# Patient Record
Sex: FEMALE | Race: OTHER | HISPANIC OR LATINO
[De-identification: names, ages, dates, MRNs, and addresses within clinical notes are randomized per-mention and may not be internally consistent; named-entity substitution may affect disease eponyms.]

---

## 2017-06-06 ENCOUNTER — APPOINTMENT (OUTPATIENT)
Dept: OPHTHALMOLOGY | Facility: CLINIC | Age: 82
End: 2017-06-06

## 2017-12-05 ENCOUNTER — APPOINTMENT (OUTPATIENT)
Dept: OPHTHALMOLOGY | Facility: CLINIC | Age: 82
End: 2017-12-05
Payer: MEDICARE

## 2017-12-05 DIAGNOSIS — G51.0 BELL'S PALSY: ICD-10-CM

## 2017-12-05 DIAGNOSIS — H35.30 UNSPECIFIED MACULAR DEGENERATION: ICD-10-CM

## 2017-12-05 DIAGNOSIS — H25.89 OTHER AGE-RELATED CATARACT: ICD-10-CM

## 2017-12-05 DIAGNOSIS — H40.053 OCULAR HYPERTENSION, BILATERAL: ICD-10-CM

## 2017-12-05 DIAGNOSIS — H25.813 COMBINED FORMS OF AGE-RELATED CATARACT, BILATERAL: ICD-10-CM

## 2017-12-05 DIAGNOSIS — H43.819 VITREOUS DEGENERATION, UNSPECIFIED EYE: ICD-10-CM

## 2017-12-05 PROCEDURE — 92014 COMPRE OPH EXAM EST PT 1/>: CPT

## 2017-12-15 ENCOUNTER — EMERGENCY (EMERGENCY)
Facility: HOSPITAL | Age: 82
LOS: 1 days | Discharge: HOME | End: 2017-12-15
Attending: EMERGENCY MEDICINE
Payer: MEDICARE

## 2017-12-15 VITALS
HEIGHT: 57 IN | OXYGEN SATURATION: 95 % | HEART RATE: 77 BPM | SYSTOLIC BLOOD PRESSURE: 190 MMHG | WEIGHT: 117.95 LBS | RESPIRATION RATE: 16 BRPM | DIASTOLIC BLOOD PRESSURE: 59 MMHG | TEMPERATURE: 97 F

## 2017-12-15 PROCEDURE — 99284 EMERGENCY DEPT VISIT MOD MDM: CPT | Mod: 25

## 2017-12-15 PROCEDURE — 99284 EMERGENCY DEPT VISIT MOD MDM: CPT

## 2017-12-15 PROCEDURE — 73080 X-RAY EXAM OF ELBOW: CPT

## 2017-12-15 PROCEDURE — 73080 X-RAY EXAM OF ELBOW: CPT | Mod: 26,RT

## 2017-12-15 PROCEDURE — 29105 APPLICATION LONG ARM SPLINT: CPT | Mod: RT

## 2017-12-15 NOTE — ED PROVIDER NOTE - MEDICAL DECISION MAKING DETAILS
89 y/o F pt with right elbow pain s/p mechanical fall. Will obtain x-ray to r/o fracture, give pain medication and reassess.
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
## 2017-12-15 NOTE — ED PROVIDER NOTE - MUSCULOSKELETAL, MLM
Large swelling and ecchymosis to the right elbow, dependant ecchymosis to the right forearm. Decreased range of motion to the right elbow secondary to the pain. No tenderness to the right shoulder, wrist, or hand with full range of motion.

## 2017-12-15 NOTE — ED PROVIDER NOTE - PROGRESS NOTE DETAILS
Patient is resting comfortably, NAD. Spoke with ortho BOLIVAR Gill who will come see patient. Patient seen and splinted by ortho. TO f/u with Shyam or Vasquez in 1 week. Return to the ED immediately if getting worse, not improving, or if having any new or troubling symptoms.

## 2017-12-15 NOTE — ED PROVIDER NOTE - OBJECTIVE STATEMENT
89 y/o F pt with no significant PMHx presents to ED c/o right elbow pain and swelling x today. Pt reports she mechanical tripped and fell today and landed on her right elbow. Pt states her elbow pain is worsened with movement. Pt denies fever, chills, nausea, vomiting, diarrhea, abd pain, numbness, tingling, weakness, LOC, head trauma, or any other complaints. NKDA.

## 2017-12-15 NOTE — ED PROVIDER NOTE - CHPI ED SYMPTOMS NEG
no fever, no chills, no nausea, no vomiting, no diarrhea, no abd pain, no numbness, no tingling, no weakness, no LOC
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
## 2018-03-27 ENCOUNTER — INPATIENT (INPATIENT)
Facility: HOSPITAL | Age: 83
LOS: 5 days | Discharge: NURSING, REHAB OR LONG-TERM CARE | DRG: 65 | End: 2018-04-02
Attending: INTERNAL MEDICINE | Admitting: INTERNAL MEDICINE
Payer: MEDICARE

## 2018-03-27 VITALS
TEMPERATURE: 97 F | OXYGEN SATURATION: 100 % | WEIGHT: 119.93 LBS | HEART RATE: 76 BPM | HEIGHT: 65 IN | DIASTOLIC BLOOD PRESSURE: 84 MMHG | RESPIRATION RATE: 18 BRPM | SYSTOLIC BLOOD PRESSURE: 190 MMHG

## 2018-03-27 DIAGNOSIS — I10 ESSENTIAL (PRIMARY) HYPERTENSION: ICD-10-CM

## 2018-03-27 DIAGNOSIS — Z29.9 ENCOUNTER FOR PROPHYLACTIC MEASURES, UNSPECIFIED: ICD-10-CM

## 2018-03-27 DIAGNOSIS — Z71.89 OTHER SPECIFIED COUNSELING: ICD-10-CM

## 2018-03-27 DIAGNOSIS — E86.0 DEHYDRATION: ICD-10-CM

## 2018-03-27 DIAGNOSIS — E11.9 TYPE 2 DIABETES MELLITUS WITHOUT COMPLICATIONS: ICD-10-CM

## 2018-03-27 DIAGNOSIS — R55 SYNCOPE AND COLLAPSE: ICD-10-CM

## 2018-03-27 LAB
ACETONE SERPL-MCNC: NEGATIVE — SIGNIFICANT CHANGE UP
ALBUMIN SERPL ELPH-MCNC: 3.4 G/DL — LOW (ref 3.5–5)
ALP SERPL-CCNC: 74 U/L — SIGNIFICANT CHANGE UP (ref 40–120)
ALT FLD-CCNC: 38 U/L DA — SIGNIFICANT CHANGE UP (ref 10–60)
ANION GAP SERPL CALC-SCNC: 8 MMOL/L — SIGNIFICANT CHANGE UP (ref 5–17)
APPEARANCE UR: CLEAR — SIGNIFICANT CHANGE UP
APTT BLD: 34.9 SEC — SIGNIFICANT CHANGE UP (ref 27.5–37.4)
AST SERPL-CCNC: 31 U/L — SIGNIFICANT CHANGE UP (ref 10–40)
BACTERIA # UR AUTO: ABNORMAL /HPF
BASOPHILS # BLD AUTO: 0.1 K/UL — SIGNIFICANT CHANGE UP (ref 0–0.2)
BASOPHILS NFR BLD AUTO: 0.8 % — SIGNIFICANT CHANGE UP (ref 0–2)
BILIRUB SERPL-MCNC: 0.3 MG/DL — SIGNIFICANT CHANGE UP (ref 0.2–1.2)
BILIRUB UR-MCNC: NEGATIVE — SIGNIFICANT CHANGE UP
BUN SERPL-MCNC: 31 MG/DL — HIGH (ref 7–18)
CALCIUM SERPL-MCNC: 9 MG/DL — SIGNIFICANT CHANGE UP (ref 8.4–10.5)
CHLORIDE SERPL-SCNC: 99 MMOL/L — SIGNIFICANT CHANGE UP (ref 96–108)
CK MB BLD-MCNC: 3.8 % — HIGH (ref 0–3.5)
CK MB CFR SERPL CALC: 5.9 NG/ML — HIGH (ref 0–3.6)
CK SERPL-CCNC: 154 U/L — SIGNIFICANT CHANGE UP (ref 21–215)
CO2 SERPL-SCNC: 26 MMOL/L — SIGNIFICANT CHANGE UP (ref 22–31)
COLOR SPEC: YELLOW — SIGNIFICANT CHANGE UP
CREAT SERPL-MCNC: 0.65 MG/DL — SIGNIFICANT CHANGE UP (ref 0.5–1.3)
DIFF PNL FLD: ABNORMAL
EOSINOPHIL # BLD AUTO: 0 K/UL — SIGNIFICANT CHANGE UP (ref 0–0.5)
EOSINOPHIL NFR BLD AUTO: 0.1 % — SIGNIFICANT CHANGE UP (ref 0–6)
EPI CELLS # UR: ABNORMAL /HPF
GLUCOSE SERPL-MCNC: 210 MG/DL — HIGH (ref 70–99)
GLUCOSE UR QL: 250
GRAN CASTS # UR COMP ASSIST: ABNORMAL /LPF
HCT VFR BLD CALC: 34 % — LOW (ref 34.5–45)
HGB BLD-MCNC: 10.5 G/DL — LOW (ref 11.5–15.5)
HYALINE CASTS # UR AUTO: ABNORMAL /LPF
INR BLD: 1.04 RATIO — SIGNIFICANT CHANGE UP (ref 0.88–1.16)
KETONES UR-MCNC: ABNORMAL
LACTATE SERPL-SCNC: 1.2 MMOL/L — SIGNIFICANT CHANGE UP (ref 0.7–2)
LEUKOCYTE ESTERASE UR-ACNC: NEGATIVE — SIGNIFICANT CHANGE UP
LYMPHOCYTES # BLD AUTO: 0.7 K/UL — LOW (ref 1–3.3)
LYMPHOCYTES # BLD AUTO: 6.1 % — LOW (ref 13–44)
MAGNESIUM SERPL-MCNC: 2.1 MG/DL — SIGNIFICANT CHANGE UP (ref 1.6–2.6)
MCHC RBC-ENTMCNC: 29.6 PG — SIGNIFICANT CHANGE UP (ref 27–34)
MCHC RBC-ENTMCNC: 30.9 GM/DL — LOW (ref 32–36)
MCV RBC AUTO: 95.6 FL — SIGNIFICANT CHANGE UP (ref 80–100)
MONOCYTES # BLD AUTO: 0.6 K/UL — SIGNIFICANT CHANGE UP (ref 0–0.9)
MONOCYTES NFR BLD AUTO: 5.6 % — SIGNIFICANT CHANGE UP (ref 2–14)
NEUTROPHILS # BLD AUTO: 9.5 K/UL — HIGH (ref 1.8–7.4)
NEUTROPHILS NFR BLD AUTO: 87.4 % — HIGH (ref 43–77)
NITRITE UR-MCNC: NEGATIVE — SIGNIFICANT CHANGE UP
NT-PROBNP SERPL-SCNC: 670 PG/ML — HIGH (ref 0–450)
OB PNL STL: NEGATIVE — SIGNIFICANT CHANGE UP
PH UR: 7 — SIGNIFICANT CHANGE UP (ref 5–8)
PLATELET # BLD AUTO: 262 K/UL — SIGNIFICANT CHANGE UP (ref 150–400)
POTASSIUM SERPL-MCNC: 4 MMOL/L — SIGNIFICANT CHANGE UP (ref 3.5–5.3)
POTASSIUM SERPL-SCNC: 4 MMOL/L — SIGNIFICANT CHANGE UP (ref 3.5–5.3)
PROT SERPL-MCNC: 7.5 G/DL — SIGNIFICANT CHANGE UP (ref 6–8.3)
PROT UR-MCNC: 100
PROTHROM AB SERPL-ACNC: 11.3 SEC — SIGNIFICANT CHANGE UP (ref 9.8–12.7)
RBC # BLD: 3.56 M/UL — LOW (ref 3.8–5.2)
RBC # FLD: 13.6 % — SIGNIFICANT CHANGE UP (ref 10.3–14.5)
RBC CASTS # UR COMP ASSIST: ABNORMAL /HPF (ref 0–2)
SODIUM SERPL-SCNC: 133 MMOL/L — LOW (ref 135–145)
SP GR SPEC: 1.01 — SIGNIFICANT CHANGE UP (ref 1.01–1.02)
TROPONIN I SERPL-MCNC: 0.07 NG/ML — HIGH (ref 0–0.04)
UROBILINOGEN FLD QL: NEGATIVE — SIGNIFICANT CHANGE UP
WBC # BLD: 10.9 K/UL — HIGH (ref 3.8–10.5)
WBC # FLD AUTO: 10.9 K/UL — HIGH (ref 3.8–10.5)
WBC UR QL: SIGNIFICANT CHANGE UP /HPF (ref 0–5)

## 2018-03-27 PROCEDURE — 70450 CT HEAD/BRAIN W/O DYE: CPT | Mod: 26

## 2018-03-27 PROCEDURE — 71045 X-RAY EXAM CHEST 1 VIEW: CPT | Mod: 26

## 2018-03-27 PROCEDURE — 72125 CT NECK SPINE W/O DYE: CPT | Mod: 26

## 2018-03-27 PROCEDURE — 99285 EMERGENCY DEPT VISIT HI MDM: CPT

## 2018-03-27 PROCEDURE — 99223 1ST HOSP IP/OBS HIGH 75: CPT | Mod: AI,GC

## 2018-03-27 RX ORDER — ASPIRIN/CALCIUM CARB/MAGNESIUM 324 MG
81 TABLET ORAL ONCE
Qty: 0 | Refills: 0 | Status: COMPLETED | OUTPATIENT
Start: 2018-03-27 | End: 2018-03-27

## 2018-03-27 RX ORDER — AZITHROMYCIN 500 MG/1
500 TABLET, FILM COATED ORAL ONCE
Qty: 0 | Refills: 0 | Status: COMPLETED | OUTPATIENT
Start: 2018-03-27 | End: 2018-03-27

## 2018-03-27 RX ORDER — ASPIRIN/CALCIUM CARB/MAGNESIUM 324 MG
81 TABLET ORAL DAILY
Qty: 0 | Refills: 0 | Status: DISCONTINUED | OUTPATIENT
Start: 2018-03-28 | End: 2018-04-02

## 2018-03-27 RX ORDER — ATORVASTATIN CALCIUM 80 MG/1
80 TABLET, FILM COATED ORAL AT BEDTIME
Qty: 0 | Refills: 0 | Status: DISCONTINUED | OUTPATIENT
Start: 2018-03-27 | End: 2018-04-02

## 2018-03-27 RX ORDER — INSULIN LISPRO 100/ML
VIAL (ML) SUBCUTANEOUS EVERY 6 HOURS
Qty: 0 | Refills: 0 | Status: DISCONTINUED | OUTPATIENT
Start: 2018-03-27 | End: 2018-04-02

## 2018-03-27 RX ORDER — ENOXAPARIN SODIUM 100 MG/ML
40 INJECTION SUBCUTANEOUS DAILY
Qty: 0 | Refills: 0 | Status: DISCONTINUED | OUTPATIENT
Start: 2018-03-27 | End: 2018-04-02

## 2018-03-27 RX ORDER — CEFTRIAXONE 500 MG/1
1 INJECTION, POWDER, FOR SOLUTION INTRAMUSCULAR; INTRAVENOUS ONCE
Qty: 0 | Refills: 0 | Status: COMPLETED | OUTPATIENT
Start: 2018-03-27 | End: 2018-03-27

## 2018-03-27 RX ORDER — CARVEDILOL PHOSPHATE 80 MG/1
12.5 CAPSULE, EXTENDED RELEASE ORAL EVERY 12 HOURS
Qty: 0 | Refills: 0 | Status: DISCONTINUED | OUTPATIENT
Start: 2018-03-27 | End: 2018-03-28

## 2018-03-27 RX ORDER — SODIUM CHLORIDE 9 MG/ML
1700 INJECTION INTRAMUSCULAR; INTRAVENOUS; SUBCUTANEOUS ONCE
Qty: 0 | Refills: 0 | Status: COMPLETED | OUTPATIENT
Start: 2018-03-27 | End: 2018-03-27

## 2018-03-27 RX ADMIN — AZITHROMYCIN 250 MILLIGRAM(S): 500 TABLET, FILM COATED ORAL at 18:40

## 2018-03-27 RX ADMIN — Medication 81 MILLIGRAM(S): at 18:41

## 2018-03-27 RX ADMIN — CEFTRIAXONE 100 GRAM(S): 500 INJECTION, POWDER, FOR SOLUTION INTRAMUSCULAR; INTRAVENOUS at 19:55

## 2018-03-27 RX ADMIN — SODIUM CHLORIDE 3400 MILLILITER(S): 9 INJECTION INTRAMUSCULAR; INTRAVENOUS; SUBCUTANEOUS at 16:16

## 2018-03-27 NOTE — H&P ADULT - ATTENDING COMMENTS
Patient was examined in the ED and discussed with Dr. Davidson.   She was found by physical therapist and neighbors on the floor between two beds.  She states that she fell and could not get up.  She denies LOC.     PMH is positive for hypertension, Bell's palsy, right, with residual facial    Cooperative 89 yo woman in NAD  Vital Signs Last 24 Hrs  T(C): 36.3 (27 Mar 2018 14:51), Max: 36.3 (27 Mar 2018 14:51)  T(F): 97.4 (27 Mar 2018 14:51), Max: 97.4 (27 Mar 2018 14:51)  HR: 76 (27 Mar 2018 14:51) (76 - 76)  BP: 190/84 (27 Mar 2018 14:51) (190/84 - 190/84)  BP(mean): --  RR: 18 (27 Mar 2018 14:51) (18 - 18)  SpO2: 100% (27 Mar 2018 14:51) (100% - 100%)  Lungs, clear  Cor, RRR  Abdomen, soft  Neurological, right peripheral facial weakness                          10.5   10.9  )-----------( 262      ( 27 Mar 2018 16:23 )             34.0       0327    133<L>  |  99  |  31<H>  ----------------------------<  210<H>  4.0   |  26  |  0.65    Ca    9.0      27 Mar 2018 16:23  Mg     2.1         TPro  7.5  /  Alb  3.4<L>  /  TBili  0.3  /  DBili  x   /  AST  31  /  ALT  38  /  AlkPhos  74  0327              Urinalysis Basic - ( 27 Mar 2018 16:23 )    Color: Yellow / Appearance: Clear / S.010 / pH: x  Gluc: x / Ketone: Trace  / Bili: Negative / Urobili: Negative   Blood: x / Protein: 100 / Nitrite: Negative   Leuk Esterase: Negative / RBC: 10-25 /HPF / WBC 0-2 /HPF   Sq Epi: x / Non Sq Epi: Moderate /HPF / Bacteria: Few /HPF    PT/INR - ( 27 Mar 2018 16:23 )   PT: 11.3 sec;   INR: 1.04 ratio       PTT - ( 27 Mar 2018 16:23 )  PTT:34.9 sec  Lactate Trend   @ 16:23 Lactate:1.2   CARDIAC MARKERS ( 27 Mar 2018 16:23 )  0.071 ng/mL / x     / 154 U/L / x     / 5.9 ng/mL  POCT Blood Glucose.: 183 mg/dL (27 Mar 2018 17:05)    < from: Xray Chest 1 View AP/PA (18 @ 16:44) >    Heart is likely enlarged. The aorta is somewhat dilated and uncoiled.    Small infiltrate off the lower right hilum is suspected.    < from: CT Head No Cont (18 @ 16:48) >      Evaluation demonstrates no evidence of mass-effect or midline shift. No   intraparenchymal mass lesions or hemorrhage is identified.There is mild   age typical white matter degeneration. There is no evidence of   hydrocephalus. No extra-axial collections are noted.    The bone windows demonstrate no gross osseous abnormalities.        Impression:  1. Unremarkable noncontrast CT scan of the brain.        < end of copied text >        IMP:  syncope, r/o MI, r/o CVA, r/o seizure          hypertension, elevated troponin          possible pneumonia          uncoiled aorta  Plan: Tele, troponin, echo, cardiology          MRI, neurology consultation          Moderate control of BP, at present          PT consultation          Case management evaluation

## 2018-03-27 NOTE — H&P ADULT - MOTOR
Limited ROM in Right upper extremity due to fracture in January   Motor strength in upper and lower extremities : 3/5   sensations intact

## 2018-03-27 NOTE — H&P ADULT - HISTORY OF PRESENT ILLNESS
89 yo F , lives alone at home , AAO*3 , walks with help of a walker , PMH of DM , HTN , [place] palsy in past on right side of face,  was brought in by neighbour after an unwitnessed fall. Patient was last seen yesterday by neighbour , doing her daily chores. As per the patient , patient had a forward fall this morning and since then she could not get up until she was found on the floor by her Physical therapist and neighbour in afternoon , who decided to bring her here. As per neighbour , patient has had recent onset of forgetfulness ( last 2-3 months ).   Patient remembers the whole event , denies any loss of consciousness , did not hit her head. Denies any joint pain , nausea , vomiting ,diarrhea , recent sick contacts , fever , or any recent hospitalization.    In ED , BP : 190/84 mmhg , HR : 76 Temp : 97.4 F  Ct head was negative. Ct Spine reveled spine degenerative changes on multiple levels.   Troponin *1 mild elevated ( 0.07) , EKG : NSR ; Ua negative   CBC : Wbc count 10.8 with Left shift   CXR : cardiomegaly , dilation of aortic root with possible infiltrate on right side of lung

## 2018-03-27 NOTE — ED ADULT NURSE NOTE - OBJECTIVE STATEMENT
pt p/w Found on floor covered with her own feces,911 called by her physical therapist who on not receiving response from patient with repeated door knock, informed building super and broke open the door per ems.  pt lives alone with no family according to her neighbor

## 2018-03-27 NOTE — H&P ADULT - PROBLEM SELECTOR PLAN 6
Goals of care discussed with patient and neighbour  Makes her own decisions , never thought about DNR /DNI, would like to think   No hcp   Remains full code at present

## 2018-03-27 NOTE — H&P ADULT - ASSESSMENT
89 yo F , lives alone at home , AAO*3 , walks with help of a walker , PMH of DM , HTN , [place] palsy in past on right side of face,  was brought in by neighbour after an unwitnessed fall. Patient was last seen yesterday by neighbour , doing her daily chores. As per the patient , patient had a forward fall this morning and since then she could not get up until she was found on the floor by her Physical therapist and neighbour in afternoon , who decided to bring her here. As per neighbour , patient has had recent onset of forgetfulness ( last 2-3 months ).   Patient remembers the whole event , denies any loss of consciousness , did not hit her head. Denies any joint pain , nausea , vomiting ,diarrhea , recent sick contacts , fever , or any recent hospitalization.    In ED , BP : 190/84 mmhg , HR : 76 Temp : 97.4 F  Ct head was negative. Ct Spine reveled spine degenerative changes on multiple levels.   Troponin *1 mild elevated ( 0.07) , EKG : NSR ; Ua negative   CBC : Wbc count 10.8 with Left shift   CXR : cardiomegaly , dilation of aortic root with possible infiltrate on right side of lung     Will admit the patient to telemetry for syncope work up , given the history of frequent falls.

## 2018-03-27 NOTE — H&P ADULT - NSHPLABSRESULTS_GEN_ALL_CORE
10.5   10.9  )-----------( 262      ( 27 Mar 2018 16:23 )             34.0     03-27    133<L>  |  99  |  31<H>  ----------------------------<  210<H>  4.0   |  26  |  0.65    Ca    9.0      27 Mar 2018 16:23  Mg     2.1     03-27    TPro  7.5  /  Alb  3.4<L>  /  TBili  0.3  /  DBili  x   /  AST  31  /  ALT  38  /  AlkPhos  74  03-27    < from: CT Cervical Spine No Cont (03.27.18 @ 16:50) >    There is no prevertebral soft tissue swelling.  Cervical lordosis is maintained. Grade 1 anterolisthesis of C2 on C3, of   C4 on C5, and of C7 on T1.  Vertebral body heights are maintained. No evidence of cervical spine   fracture.    Multilevel degenerative changes manifested by disc space narrowing,   vertebral body osteophytes and facet arthrosis, resulting in varying   degrees of bony encroachment on the neural foramina at the left C5-C6 and   C6-C7 levels. Bony fusion across the left C2-C4 and right C2-C3 facets.   Bony fusion across the C3-C4 vertebral bodies.    Mild scarring of the visualized lung apices.    IMPRESSION:  No CT evidence of cervical spine fracture.      < end of copied text >    < from: CT Head No Cont (03.27.18 @ 16:48) >    pression:  1. Unremarkable noncontrast CT scan of the brain.          < end of copied text >

## 2018-03-27 NOTE — H&P ADULT - NEUROLOGICAL DETAILS
responds to verbal commands/responds to pain/sensation intact/deep reflexes intact/cranial nerves intact/alert and oriented x 3

## 2018-03-27 NOTE — H&P ADULT - PROBLEM SELECTOR PLAN 4
Claims to have h/o HTN   Takes coreg 12.5 bid at home and something else  not sure of meds   Will start with coreg for now   Dash diet   Obtain medication list  f/u echo

## 2018-03-27 NOTE — H&P ADULT - NSHPSOURCEINFORD_GEN_ALL_CORE
Decreased energy intake x >1 mth w significant >8% weight loss x < 3 mths, 1+ generalized edema. Patient

## 2018-03-27 NOTE — ED PROVIDER NOTE - MUSCULOSKELETAL, MLM
Spine appears normal, range of motion is not limited, no muscle or joint tenderness, Rt arm- mild atrophy Spine appears normal, range of motion is not limited, no muscle or joint tenderness, penelope knees- erythema, Rt arm- mild atrophy

## 2018-03-27 NOTE — H&P ADULT - PROBLEM SELECTOR PLAN 3
Patient says she has h/o diabetes   Does not remember her meds   Primary team to obtain medications : pharmacy updated In chart   Will start on sliding scale for now , accuchecks q6 as will keep NPO , get speech and swallow evaluation ( patient noted to have food lingering in mouth when I examined her )  f/u a1c Patient says she has h/o diabetes   Does not remember her meds   Primary team to obtain medications : pharmacy updated In chart   Will start on sliding scale for now  f/u a1c

## 2018-03-27 NOTE — ED ADULT TRIAGE NOTE - CHIEF COMPLAINT QUOTE
Found on floor covered with her own feces,911 called by her physical therapist who on not receiving response from patient with repeated door knock, informed building super and broke open the door per ems

## 2018-03-27 NOTE — ED PROVIDER NOTE - OBJECTIVE STATEMENT
89 y.o. female BIBA as per neighbor, last seen normal 4pm yest., today, when PT(pt with fractured elbow from a fall few mos ago) arrived to pt's apt @ 1:30pm, no answer from pt, super opened door, found pt lying bet 2 beds in bedroom, pt got up @ 7am, unable to ambulate & fell, pt was lying in her own diarrhea. Pt claims she fell forwards, denies head injury, no LOC.  Pt normally walks with a walker 89 y.o. female BIBA as per neighbor, last seen normal 4pm yest., today, when PT(pt with fractured elbow from a fall few mos ago) arrived to pt's apt @ 1:30pm, no answer from pt, super opened door, found pt lying bet 2 beds in bedroom, pt got up @ 7am, unable to ambulate & fell, pt was lying in her own diarrhea. Pt claims she fell forwards, denies head injury, no LOC.  Pt normally walks with a walker.  Pt has no family @ all.

## 2018-03-27 NOTE — ED ADULT NURSE NOTE - ED STAT RN HANDOFF DETAILS 3
report given to MIRA Tavarez on [address] room 517 in stable condition for continuation of care. pt a&ox3, hard of hearing. admitted for dehydration, weakness. 20G left arm. hx of bells palsy.

## 2018-03-27 NOTE — H&P ADULT - RS GEN PE MLT RESP DETAILS PC
breath sounds equal/good air movement/clear to auscultation bilaterally/no chest wall tenderness/normal/airway patent

## 2018-03-27 NOTE — H&P ADULT - PROBLEM SELECTOR PLAN 2
Patient comes in with no cough , no fever   Noted to have a mild leucocytosis , 10.8 with left shift   CXR shows a questionable Right hilum infiltrate ?  Will treat empirically with Rocephin , Zithromax for now   f/u blood cx   f/u procalcitonin Patient comes in with no cough , no fever   Noted to have a white count of 10.8 with left shift   CXR shows a questionable Right hilum infiltrate ?  s/p one dose of Rocephin , Zithromax in Ed  f/u blood cx   f/u procalcitonin  Restart antibiotics if needed

## 2018-03-27 NOTE — H&P ADULT - PROBLEM SELECTOR PLAN 5
IMPROVE VTE Individual Risk Assessment          RISK                                                          Points  [  ] Previous VTE                                                3  [  ] Thrombophilia                                             2  [  ] Lower limb paralysis                                   2        (unable to hold up >15 seconds)    [  ] Current Cancer                                             2         (within 6 months)  [ * ] Immobilization > 24 hrs                              1  [  ] ICU/CCU stay > 24 hours                             1  [ * ] Age > 60                                                         1    IMPROVE VTE Score: 2   Will start with Levonox for dvt ppx  No indication for gi ppx

## 2018-03-27 NOTE — H&P ADULT - PROBLEM SELECTOR PLAN 1
Patient comes in with frequent falls .  Will rule out cardiac and neurological causes of syncope  Monitor on Telemetry   Cardiac enzymes , trop*1 : 0.007 , Monitor T2 , T3   Get echo   Neurological causes : Ct head was negative , Ct spine shows degenerative changes in spine at multiple levels   Will rule out CVA , start on asa, statin for now   Will get Vitamin B12 , D3 levels , r/o peripheral neuropathy   Could be diabetic neuropathy , will check a1c   Patient has hb of 10.8 , unknown baseline ( please obtain baseline from PMD )  Will order anemia panel , check folate , tsh   will check Orthostatic to rule out vasovagal causes   Neuro evaluation , no h/o seizures  Will get PT consult Patient comes in with frequent falls .  Will rule out cardiac and neurological causes of syncope  Monitor on Telemetry , Dr. Jean consulted  Cardiac enzymes , trop*1 : 0.007 , Monitor T2 , T3   Get echo   Neurological causes : Ct head was negative , Ct spine shows degenerative changes in spine at multiple levels   Will rule out CVA , start on asa, statin for now   Will get Vitamin B12 , D3 levels , r/o peripheral neuropathy   Could be diabetic neuropathy , will check a1c   Patient has hb of 10.8 , unknown baseline ( please obtain baseline from PMD )  Will order anemia panel , check folate , tsh   will check Orthostatic to rule out vasovagal causes   Neuro evaluation , Dr. David , no h/o seizures  Will get PT consult

## 2018-03-28 DIAGNOSIS — I63.8 OTHER CEREBRAL INFARCTION: ICD-10-CM

## 2018-03-28 DIAGNOSIS — I63.9 CEREBRAL INFARCTION, UNSPECIFIED: ICD-10-CM

## 2018-03-28 LAB
24R-OH-CALCIDIOL SERPL-MCNC: 30.2 NG/ML — SIGNIFICANT CHANGE UP (ref 30–80)
ANION GAP SERPL CALC-SCNC: 7 MMOL/L — SIGNIFICANT CHANGE UP (ref 5–17)
BUN SERPL-MCNC: 22 MG/DL — HIGH (ref 7–18)
CALCIUM SERPL-MCNC: 8.7 MG/DL — SIGNIFICANT CHANGE UP (ref 8.4–10.5)
CHLORIDE SERPL-SCNC: 101 MMOL/L — SIGNIFICANT CHANGE UP (ref 96–108)
CHOLEST SERPL-MCNC: 144 MG/DL — SIGNIFICANT CHANGE UP (ref 10–199)
CK MB BLD-MCNC: 1.6 % — SIGNIFICANT CHANGE UP (ref 0–3.5)
CK MB BLD-MCNC: 3.3 % — SIGNIFICANT CHANGE UP (ref 0–3.5)
CK MB CFR SERPL CALC: 5.7 NG/ML — HIGH (ref 0–3.6)
CK MB CFR SERPL CALC: 5.9 NG/ML — HIGH (ref 0–3.6)
CK SERPL-CCNC: 175 U/L — SIGNIFICANT CHANGE UP (ref 21–215)
CK SERPL-CCNC: 377 U/L — HIGH (ref 21–215)
CO2 SERPL-SCNC: 28 MMOL/L — SIGNIFICANT CHANGE UP (ref 22–31)
CREAT SERPL-MCNC: 0.49 MG/DL — LOW (ref 0.5–1.3)
CULTURE RESULTS: SIGNIFICANT CHANGE UP
FERRITIN SERPL-MCNC: 155 NG/ML — HIGH (ref 15–150)
FOLATE SERPL-MCNC: >20 NG/ML — SIGNIFICANT CHANGE UP (ref 4.8–24.2)
GLUCOSE BLDC GLUCOMTR-MCNC: 153 MG/DL — HIGH (ref 70–99)
GLUCOSE BLDC GLUCOMTR-MCNC: 213 MG/DL — HIGH (ref 70–99)
GLUCOSE SERPL-MCNC: 134 MG/DL — HIGH (ref 70–99)
HBA1C BLD-MCNC: 6.8 % — HIGH (ref 4–5.6)
HCT VFR BLD CALC: 30.8 % — LOW (ref 34.5–45)
HDLC SERPL-MCNC: 49 MG/DL — SIGNIFICANT CHANGE UP (ref 40–125)
HGB BLD-MCNC: 9.5 G/DL — LOW (ref 11.5–15.5)
IRON SATN MFR SERPL: 14 % — LOW (ref 15–50)
IRON SATN MFR SERPL: 33 UG/DL — LOW (ref 40–150)
LIPID PNL WITH DIRECT LDL SERPL: 85 MG/DL — SIGNIFICANT CHANGE UP
MAGNESIUM SERPL-MCNC: 2 MG/DL — SIGNIFICANT CHANGE UP (ref 1.6–2.6)
MCHC RBC-ENTMCNC: 29.4 PG — SIGNIFICANT CHANGE UP (ref 27–34)
MCHC RBC-ENTMCNC: 31 GM/DL — LOW (ref 32–36)
MCV RBC AUTO: 95 FL — SIGNIFICANT CHANGE UP (ref 80–100)
PHOSPHATE SERPL-MCNC: 2.8 MG/DL — SIGNIFICANT CHANGE UP (ref 2.5–4.5)
PLATELET # BLD AUTO: 234 K/UL — SIGNIFICANT CHANGE UP (ref 150–400)
POTASSIUM SERPL-MCNC: 3.8 MMOL/L — SIGNIFICANT CHANGE UP (ref 3.5–5.3)
POTASSIUM SERPL-SCNC: 3.8 MMOL/L — SIGNIFICANT CHANGE UP (ref 3.5–5.3)
PROCALCITONIN SERPL-MCNC: 0.09 NG/ML — HIGH (ref 0–0.04)
RBC # BLD: 3.24 M/UL — LOW (ref 3.8–5.2)
RBC # FLD: 13.6 % — SIGNIFICANT CHANGE UP (ref 10.3–14.5)
SODIUM SERPL-SCNC: 136 MMOL/L — SIGNIFICANT CHANGE UP (ref 135–145)
SPECIMEN SOURCE: SIGNIFICANT CHANGE UP
TIBC SERPL-MCNC: 242 UG/DL — LOW (ref 250–450)
TOTAL CHOLESTEROL/HDL RATIO MEASUREMENT: 2.9 RATIO — LOW (ref 3.3–7.1)
TRIGL SERPL-MCNC: 52 MG/DL — SIGNIFICANT CHANGE UP (ref 10–149)
TROPONIN I SERPL-MCNC: 0.19 NG/ML — HIGH (ref 0–0.04)
TROPONIN I SERPL-MCNC: 0.23 NG/ML — HIGH (ref 0–0.04)
TSH SERPL-MCNC: 1.17 UU/ML — SIGNIFICANT CHANGE UP (ref 0.34–4.82)
UIBC SERPL-MCNC: 209 UG/DL — SIGNIFICANT CHANGE UP (ref 110–370)
VIT B12 SERPL-MCNC: >2000 PG/ML — HIGH (ref 232–1245)
WBC # BLD: 6.5 K/UL — SIGNIFICANT CHANGE UP (ref 3.8–10.5)
WBC # FLD AUTO: 6.5 K/UL — SIGNIFICANT CHANGE UP (ref 3.8–10.5)

## 2018-03-28 PROCEDURE — 70548 MR ANGIOGRAPHY NECK W/DYE: CPT | Mod: 26

## 2018-03-28 PROCEDURE — 99222 1ST HOSP IP/OBS MODERATE 55: CPT

## 2018-03-28 PROCEDURE — 99233 SBSQ HOSP IP/OBS HIGH 50: CPT

## 2018-03-28 PROCEDURE — 70544 MR ANGIOGRAPHY HEAD W/O DYE: CPT | Mod: 26,59

## 2018-03-28 PROCEDURE — 99223 1ST HOSP IP/OBS HIGH 75: CPT

## 2018-03-28 PROCEDURE — 70551 MRI BRAIN STEM W/O DYE: CPT | Mod: 26

## 2018-03-28 RX ORDER — DEXTROSE MONOHYDRATE, SODIUM CHLORIDE, AND POTASSIUM CHLORIDE 50; .745; 4.5 G/1000ML; G/1000ML; G/1000ML
1000 INJECTION, SOLUTION INTRAVENOUS
Qty: 0 | Refills: 0 | Status: DISCONTINUED | OUTPATIENT
Start: 2018-03-28 | End: 2018-04-02

## 2018-03-28 RX ORDER — CARVEDILOL PHOSPHATE 80 MG/1
3.12 CAPSULE, EXTENDED RELEASE ORAL EVERY 12 HOURS
Qty: 0 | Refills: 0 | Status: DISCONTINUED | OUTPATIENT
Start: 2018-03-28 | End: 2018-03-30

## 2018-03-28 RX ADMIN — ATORVASTATIN CALCIUM 80 MILLIGRAM(S): 80 TABLET, FILM COATED ORAL at 00:32

## 2018-03-28 RX ADMIN — CARVEDILOL PHOSPHATE 3.12 MILLIGRAM(S): 80 CAPSULE, EXTENDED RELEASE ORAL at 18:28

## 2018-03-28 RX ADMIN — Medication 1: at 18:27

## 2018-03-28 RX ADMIN — DEXTROSE MONOHYDRATE, SODIUM CHLORIDE, AND POTASSIUM CHLORIDE 55 MILLILITER(S): 50; .745; 4.5 INJECTION, SOLUTION INTRAVENOUS at 22:45

## 2018-03-28 RX ADMIN — Medication 1: at 06:03

## 2018-03-28 RX ADMIN — Medication 81 MILLIGRAM(S): at 11:32

## 2018-03-28 RX ADMIN — ENOXAPARIN SODIUM 40 MILLIGRAM(S): 100 INJECTION SUBCUTANEOUS at 11:32

## 2018-03-28 RX ADMIN — CARVEDILOL PHOSPHATE 12.5 MILLIGRAM(S): 80 CAPSULE, EXTENDED RELEASE ORAL at 00:31

## 2018-03-28 RX ADMIN — ATORVASTATIN CALCIUM 80 MILLIGRAM(S): 80 TABLET, FILM COATED ORAL at 21:20

## 2018-03-28 NOTE — CONSULT NOTE ADULT - SUBJECTIVE AND OBJECTIVE BOX
CHIEF COMPLAINT: Fall    HPI: 89 yo F with DM and HTN who presented after an unwitnessed fall. Patient     PAST MEDICAL & SURGICAL HISTORY:  As above, also Bell's palsy  No significant past surgical history      Allergies    No Known Allergies    MEDICATIONS  (STANDING):  aspirin  chewable 81 milliGRAM(s) Oral daily  atorvastatin 80 milliGRAM(s) Oral at bedtime  carvedilol 12.5 milliGRAM(s) Oral every 12 hours  enoxaparin Injectable 40 milliGRAM(s) SubCutaneous daily  insulin lispro (HumaLOG) corrective regimen sliding scale   SubCutaneous every 6 hours    MEDICATIONS  (PRN):    FAMILY HISTORY:  No pertinent family history in first degree relatives    No family history of premature coronary artery disease or sudden cardiac death    SOCIAL HISTORY:  Smoking-  Alcohol-  Illicit Drug use-    REVIEW OF SYSTEMS:  Constitutional: [ ] fever, [ ]weight loss,  [ ]fatigue  Eyes: [ ] visual changes  Respiratory: [ ]shortness of breath;  [ ] cough, [ ]wheezing, [ ]chills, [ ]hemoptysis  Cardiovascular: [ ] chest pain, [ ]palpitations, [ ]dizziness,  [ ]leg swelling  Gastrointestinal: [ ] abdominal pain, [ ]nausea, [ ]vomiting,  [ ]diarrhea   Genitourinary: [ ] dysuria, [ ] hematuria  Neurologic: [ ] headaches [ ] tremors  [ ] weakness [ ] lightheadedness  Skin: [ ] itching, [ ]burning, [ ] rashes  Endocrine: [ ] heat or cold intolerance  Musculoskeletal: [ ] joint pain or swelling; [ ] muscle, back, or extremity pain  Psychiatric: [ ] depression, [ ]anxiety, [ ]mood swings, or [ ]difficulty sleeping  Hematologic: [ ] easy bruising, [ ] bleeding gums       [ x] All others negative	  [ ] Unable to obtain    Vital Signs Last 24 Hrs  T(C): 37 (28 Mar 2018 11:18), Max: 37.1 (28 Mar 2018 07:13)  T(F): 98.6 (28 Mar 2018 11:18), Max: 98.8 (28 Mar 2018 07:13)  HR: 60 (28 Mar 2018 11:18) (58 - 76)  BP: 154/53 (28 Mar 2018 11:18) (111/48 - 190/84)  BP(mean): --  RR: 18 (28 Mar 2018 11:18) (18 - 18)  SpO2: 100% (28 Mar 2018 11:18) (98% - 100%)  I&O's Summary      PHYSICAL EXAM:  General: No acute distress  HEENT: EOMI, PERRL  Neck: Supple, No JVD  Lungs: Clear to auscultation bilaterally; No rales or wheezing  Heart: Regular rate and rhythm; No murmurs, rubs, or gallops  Abdomen: Nontender, bowel sounds present  Extremities: No clubbing, cyanosis, or edema  Nervous system:  Alert & Oriented X3, no focal deficits  Psychiatric: Normal affect  Skin: No rashes or lesions      LABS:  03-28    136  |  101  |  22<H>  ----------------------------<  134<H>  3.8   |  28  |  0.49<L>    Ca    8.7      28 Mar 2018 06:09  Phos  2.8     03-28  Mg     2.0     03-28    TPro  7.5  /  Alb  3.4<L>  /  TBili  0.3  /  DBili  x   /  AST  31  /  ALT  38  /  AlkPhos  74  03-27    Creatinine Trend: 0.49<--, 0.65<--                        9.5    6.5   )-----------( 234      ( 28 Mar 2018 06:09 )             30.8     PT/INR - ( 27 Mar 2018 16:23 )   PT: 11.3 sec;   INR: 1.04 ratio         PTT - ( 27 Mar 2018 16:23 )  PTT:34.9 sec    Lipid Panel: Cholesterol, Serum 144  Direct LDL 85  HDL Cholesterol, Serum 49  Triglycerides, Serum 52    Cardiac Enzymes: CARDIAC MARKERS ( 28 Mar 2018 12:07 )  0.193 ng/mL / x     / 377 U/L / x     / 5.9 ng/mL  CARDIAC MARKERS ( 28 Mar 2018 01:08 )  0.226 ng/mL / x     / 175 U/L / x     / 5.7 ng/mL  CARDIAC MARKERS ( 27 Mar 2018 16:23 )  0.071 ng/mL / x     / 154 U/L / x     / 5.9 ng/mL      Serum Pro-Brain Natriuretic Peptide: 670 pg/mL (03-27-18 @ 16:23)    03-28 [id number] 6.8    RADIOLOGY: < from: Xray Chest 1 View AP/PA (03.27.18 @ 16:44) >  Heart is likely enlarged. The aorta is somewhat dilated and uncoiled.    Small infiltrate off the lower right hilum is suspected.    < end of copied text >    < from: CT Head No Cont (03.27.18 @ 16:48) >  Impression:  1. Unremarkable noncontrast CT scan of the brain.        < end of copied text >      ECG [my interpretation]:    TELEMETRY:    ECHO:    STRESS TEST:    CATHETERIZATION: CHIEF COMPLAINT: Fall    HPI: 89 yo F with DM and HTN who presented after an unwitnessed fall. Patient reports that she was walking from her bed to bathroom when she fell. She categorically denies loss of consciousness and states she remembers the entire episode. She denies any preceding or subsequent chest pain, dyspnea, or palpitations. At the time of my exam, she was complaining of left-sided arm weakness.  She did not have any chest pain or dyspnea.     PAST MEDICAL & SURGICAL HISTORY:  As above, also Bell's palsy  No significant past surgical history    Allergies    No Known Allergies    MEDICATIONS  (STANDING):  aspirin  chewable 81 milliGRAM(s) Oral daily  atorvastatin 80 milliGRAM(s) Oral at bedtime  carvedilol 12.5 milliGRAM(s) Oral every 12 hours  enoxaparin Injectable 40 milliGRAM(s) SubCutaneous daily  insulin lispro (HumaLOG) corrective regimen sliding scale   SubCutaneous every 6 hours    MEDICATIONS  (PRN):    FAMILY HISTORY:  Father: Had CAD    SOCIAL HISTORY:  Smoking-Never  Alcohol-Social  Illicit Drug use-Denies    REVIEW OF SYSTEMS:  Constitutional: [ ] fever, [ ]weight loss,  [ ]fatigue  Eyes: [ ] visual changes  Respiratory: [ ]shortness of breath;  [ ] cough, [ ]wheezing, [ ]chills, [ ]hemoptysis  Cardiovascular: [ ] chest pain, [ ]palpitations, [ ]dizziness,  [ ]leg swelling  Gastrointestinal: [ ] abdominal pain, [ ]nausea, [ ]vomiting,  [ ]diarrhea   Genitourinary: [ ] dysuria, [ ] hematuria  Neurologic: [ ] headaches [ ] tremors  [ x] weakness [ ] lightheadedness  Skin: [ ] itching, [ ]burning, [ ] rashes  Endocrine: [ ] heat or cold intolerance  Musculoskeletal: [ ] joint pain or swelling; [ ] muscle, back, or extremity pain  Psychiatric: [ ] depression, [ ]anxiety, [ ]mood swings, or [ ]difficulty sleeping  Hematologic: [ ] easy bruising, [ ] bleeding gums       [ x] All others negative	  [ ] Unable to obtain    Vital Signs Last 24 Hrs  T(C): 37 (28 Mar 2018 11:18), Max: 37.1 (28 Mar 2018 07:13)  T(F): 98.6 (28 Mar 2018 11:18), Max: 98.8 (28 Mar 2018 07:13)  HR: 60 (28 Mar 2018 11:18) (58 - 76)  BP: 154/53 (28 Mar 2018 11:18) (111/48 - 190/84)  BP(mean): --  RR: 18 (28 Mar 2018 11:18) (18 - 18)  SpO2: 100% (28 Mar 2018 11:18) (98% - 100%)  I&O's Summary      PHYSICAL EXAM:  General: No acute distress  HEENT: EOMI, PERRL  Neck: Supple, No JVD  Lungs: Clear to auscultation bilaterally; No rales or wheezing  Heart: Regular rate and rhythm; III/VI CLIFFORD at LUSB  Abdomen: Nontender, bowel sounds present  Extremities: No clubbing, cyanosis, or edema  Nervous system:  Alert & Oriented X3, left arm weakness  Psychiatric: Normal affect  Skin: No rashes or lesions      LABS:  03-28    136  |  101  |  22<H>  ----------------------------<  134<H>  3.8   |  28  |  0.49<L>    Ca    8.7      28 Mar 2018 06:09  Phos  2.8     03-28  Mg     2.0     03-28    TPro  7.5  /  Alb  3.4<L>  /  TBili  0.3  /  DBili  x   /  AST  31  /  ALT  38  /  AlkPhos  74  03-27    Creatinine Trend: 0.49<--, 0.65<--                        9.5    6.5   )-----------( 234      ( 28 Mar 2018 06:09 )             30.8     PT/INR - ( 27 Mar 2018 16:23 )   PT: 11.3 sec;   INR: 1.04 ratio         PTT - ( 27 Mar 2018 16:23 )  PTT:34.9 sec    Lipid Panel: Cholesterol, Serum 144  Direct LDL 85  HDL Cholesterol, Serum 49  Triglycerides, Serum 52    Cardiac Enzymes: CARDIAC MARKERS ( 28 Mar 2018 12:07 )  0.193 ng/mL / x     / 377 U/L / x     / 5.9 ng/mL  CARDIAC MARKERS ( 28 Mar 2018 01:08 )  0.226 ng/mL / x     / 175 U/L / x     / 5.7 ng/mL  CARDIAC MARKERS ( 27 Mar 2018 16:23 )  0.071 ng/mL / x     / 154 U/L / x     / 5.9 ng/mL      Serum Pro-Brain Natriuretic Peptide: 670 pg/mL (03-27-18 @ 16:23)    03-28 [id number] 6.8    RADIOLOGY: < from: Xray Chest 1 View AP/PA (03.27.18 @ 16:44) >  Heart is likely enlarged. The aorta is somewhat dilated and uncoiled.    Small infiltrate off the lower right hilum is suspected.    < end of copied text >    < from: CT Head No Cont (03.27.18 @ 16:48) >  Impression:  1. Unremarkable noncontrast CT scan of the brain.        < end of copied text >  < from: MR Head No Cont (03.28.18 @ 14:18) >  IMPRESSION:    Acute bland right pontine infarct    < end of copied text >  < from: MR Angio Neck w/ IV Cont (03.28.18 @ 14:49) >  IMPRESSION:    Moderately severe proximal left ICA stenosis    < end of copied text >      ECG [my interpretation]: 3/27/2018 @ 15:25: Sinus rhythm, left axis deviation    TELEMETRY: Sinus bradycardia at 43, occasional PACs    ECHO: Pending

## 2018-03-28 NOTE — PROGRESS NOTE ADULT - SUBJECTIVE AND OBJECTIVE BOX
MEDICAL ATTENDING NOTE  Patient is a 89y old  Female who presents with a chief complaint of Fall (27 Mar 2018 18:23)      INTERVAL HPI/OVERNIGHT EVENTS: no new complaints    MEDICATIONS  (STANDING):  aspirin  chewable 81 milliGRAM(s) Oral daily  atorvastatin 80 milliGRAM(s) Oral at bedtime  carvedilol 12.5 milliGRAM(s) Oral every 12 hours  enoxaparin Injectable 40 milliGRAM(s) SubCutaneous daily  insulin lispro (HumaLOG) corrective regimen sliding scale   SubCutaneous every 6 hours  sodium chloride 0.9% with potassium chloride 20 mEq/L 1000 milliLiter(s) (55 mL/Hr) IV Continuous <Continuous>    MEDICATIONS  (PRN):      __________________________________________________  REVIEW OF SYSTEMS:    CONSTITUTIONAL: No fever,   EYES: no acute visual disturbances  NECK: No pain or stiffness  RESPIRATORY: No cough; No shortness of breath  CARDIOVASCULAR: No chest pain, no palpitations  GASTROINTESTINAL: No pain. No nausea or vomiting; No diarrhea   NEUROLOGICAL: No headache or numbness, no tremors  MUSCULOSKELETAL: No joint pain, no muscle pain  GENITOURINARY: no dysuria, no frequency, no hesitancy  PSYCHIATRY: no depression , no anxiety  ALL OTHER  ROS negative        Vital Signs Last 24 Hrs  T(C): 36.6 (28 Mar 2018 15:33), Max: 37.1 (28 Mar 2018 07:13)  T(F): 97.8 (28 Mar 2018 15:33), Max: 98.8 (28 Mar 2018 07:13)  HR: 64 (28 Mar 2018 15:33) (58 - 68)  BP: 162/66 (28 Mar 2018 15:33) (111/48 - 164/62)  BP(mean): --  RR: 18 (28 Mar 2018 15:33) (18 - 18)  SpO2: 99% (28 Mar 2018 15:33) (98% - 100%)    ________________________________________________  PHYSICAL EXAM:  GENERAL: NAD  HEENT: Normocephalic;  conjunctivae and sclerae clear; moist mucous membranes;   NECK : supple  CHEST/LUNG: Clear to auscultation bilaterally with good air entry   HEART: S1 S2  regular; no murmurs, gallops or rubs  ABDOMEN: Soft, Nontender, Nondistended; Bowel sounds present  EXTREMITIES: no cyanosis; no edema; no calf tenderness  SKIN: warm and dry; no rash  NERVOUS SYSTEM:  Awake and alert; Oriented  to place, person and time ; no new deficits    _________________________________________________  LABS:                        9.5    6.5   )-----------( 234      ( 28 Mar 2018 06:09 )             30.8     03-28    136  |  101  |  22<H>  ----------------------------<  134<H>  3.8   |  28  |  0.49<L>    Ca    8.7      28 Mar 2018 06:09  Phos  2.8     03-28  Mg     2.0     03-28    TPro  7.5  /  Alb  3.4<L>  /  TBili  0.3  /  DBili  x   /  AST  31  /  ALT  38  /  AlkPhos  74  03-27    PT/INR - ( 27 Mar 2018 16:23 )   PT: 11.3 sec;   INR: 1.04 ratio         PTT - ( 27 Mar 2018 16:23 )  PTT:34.9 sec          POCT Blood Glucose.: 162 mg/dL (28 Mar 2018 05:21)  POCT Blood Glucose.: 183 mg/dL (27 Mar 2018 17:05)        RADIOLOGY & ADDITIONAL TESTS:     MR Head No Cont (03.28.18 @ 14:18)  There is a small wedge-shaped focus of diffusion restriction with very faint early edema in the right half of the kelvin consistent with acute infarct. There is no mass effect or hemorrhage. There is mild to moderate   chronic microvascular ischemic change as expected for age with relative preservation of cortical volume. The orbital and sellar contents and cerebellar tonsils are within normal limits.    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 MEDICAL ATTENDING NOTE  Patient is a 89y old  Female who presents with a chief complaint of Fall (27 Mar 2018 18:23)      INTERVAL HPI/OVERNIGHT EVENTS: no new complaints    MEDICATIONS  (STANDING):  aspirin  chewable 81 milliGRAM(s) Oral daily  atorvastatin 80 milliGRAM(s) Oral at bedtime  carvedilol 12.5 milliGRAM(s) Oral every 12 hours  enoxaparin Injectable 40 milliGRAM(s) SubCutaneous daily  insulin lispro (HumaLOG) corrective regimen sliding scale   SubCutaneous every 6 hours  sodium chloride 0.9% with potassium chloride 20 mEq/L 1000 milliLiter(s) (55 mL/Hr) IV Continuous <Continuous>          __________________________________________________  REVIEW OF SYSTEMS:    CONSTITUTIONAL: No fever,   EYES: no acute visual disturbances  NECK: No pain or stiffness  RESPIRATORY: No cough; No shortness of breath  CARDIOVASCULAR: No chest pain, no palpitations  GASTROINTESTINAL: No pain. No nausea or vomiting; No diarrhea   NEUROLOGICAL: No headache or numbness, no tremors  MUSCULOSKELETAL: No joint pain, no muscle pain  GENITOURINARY: no dysuria, no frequency, no hesitancy  PSYCHIATRY: no depression , no anxiety  ALL OTHER  ROS negative        Vital Signs Last 24 Hrs  T(C): 36.6 (28 Mar 2018 15:33), Max: 37.1 (28 Mar 2018 07:13)  T(F): 97.8 (28 Mar 2018 15:33), Max: 98.8 (28 Mar 2018 07:13)  HR: 64 (28 Mar 2018 15:33) (58 - 68)  BP: 162/66 (28 Mar 2018 15:33) (111/48 - 164/62)  RR: 18 (28 Mar 2018 15:33) (18 - 18)  SpO2: 99% (28 Mar 2018 15:33) (98% - 100%)    ________________________________________________  PHYSICAL EXAM:  GENERAL: NAD  HEENT: Normocephalic;  conjunctivae and sclerae clear; moist mucous membranes;   NECK : supple  CHEST/LUNG: Clear to auscultation bilaterally with good air entry   HEART: S1 S2  regular; no murmurs, gallops or rubs  ABDOMEN: Soft, Nontender, Nondistended; Bowel sounds present  EXTREMITIES: no cyanosis; no edema; no calf tenderness  SKIN: warm and dry; no rash  NERVOUS SYSTEM:  Awake and alert; Oriented  to place, person and time ; no new deficits    _________________________________________________  LABS:                        9.5    6.5   )-----------( 234      ( 28 Mar 2018 06:09 )             30.8     03-28    136  |  101  |  22<H>  ----------------------------<  134<H>  3.8   |  28  |  0.49<L>    Ca    8.7      28 Mar 2018 06:09  Phos  2.8     03-28  Mg     2.0     03-28    TPro  7.5  /  Alb  3.4<L>  /  TBili  0.3  /  DBili  x   /  AST  31  /  ALT  38  /  AlkPhos  74  03-27    PT/INR - ( 27 Mar 2018 16:23 )   PT: 11.3 sec;   INR: 1.04 ratio    PTT - ( 27 Mar 2018 16:23 )  PTT:34.9 sec          POCT Blood Glucose.: 162 mg/dL (28 Mar 2018 05:21)  POCT Blood Glucose.: 183 mg/dL (27 Mar 2018 17:05)        RADIOLOGY & ADDITIONAL TESTS:     MR Head No Cont (03.28.18 @ 14:18)  There is a small wedge-shaped focus of diffusion restriction with very faint early edema in the right half of the kelvin consistent with acute infarct. There is no mass effect or hemorrhage. There is mild to moderate   chronic microvascular ischemic change as expected for age with relative preservation of cortical volume. The orbital and sellar contents and cerebellar tonsils are within normal limits.    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 MEDICAL ATTENDING NOTE  Patient is a 89y old  Female who presents with a chief complaint of Fall (27 Mar 2018 18:23)      INTERVAL HPI/OVERNIGHT EVENTS: patient has new left sided weakness arm >> legs; feels hungry; pain right elbow but no swelling      MEDICATIONS  (STANDING):  aspirin  chewable 81 milliGRAM(s) Oral daily  atorvastatin 80 milliGRAM(s) Oral at bedtime  carvedilol 12.5 milliGRAM(s) Oral every 12 hours  enoxaparin Injectable 40 milliGRAM(s) SubCutaneous daily  insulin lispro (HumaLOG) corrective regimen sliding scale   SubCutaneous every 6 hours  sodium chloride 0.9% with potassium chloride 20 mEq/L 1000 milliLiter(s) (55 mL/Hr) IV Continuous <Continuous>          __________________________________________________  REVIEW OF SYSTEMS:    CONSTITUTIONAL: No fever,   EYES: no acute visual disturbances  NECK: No pain or stiffness  RESPIRATORY: No cough; No shortness of breath  CARDIOVASCULAR: No chest pain, no palpitations  GASTROINTESTINAL: No pain. No nausea or vomiting; No diarrhea   NEUROLOGICAL: No headache or numbness, no tremors  MUSCULOSKELETAL: No joint pain, no muscle pain  GENITOURINARY: no dysuria, no frequency, no hesitancy  PSYCHIATRY: no depression , no anxiety  ALL OTHER  ROS negative        Vital Signs Last 24 Hrs  T(C): 36.6 (28 Mar 2018 15:33), Max: 37.1 (28 Mar 2018 07:13)  T(F): 97.8 (28 Mar 2018 15:33), Max: 98.8 (28 Mar 2018 07:13)  HR: 64 (28 Mar 2018 15:33) (58 - 68)  BP: 162/66 (28 Mar 2018 15:33) (111/48 - 164/62)  RR: 18 (28 Mar 2018 15:33) (18 - 18)  SpO2: 99% (28 Mar 2018 15:33) (98% - 100%)    ________________________________________________  PHYSICAL EXAM:  GENERAL: NAD  HEENT: Normocephalic;  conjunctivae and sclerae clear; moist mucous membranes;   NECK : supple  CHEST/LUNG: Clear to auscultation bilaterally with good air entry   HEART: S1 S2  regular; no murmurs, gallops or rubs  ABDOMEN: Soft, Nontender, Nondistended; Bowel sounds present  EXTREMITIES: no cyanosis; no edema; no calf tenderness  SKIN: warm and dry; no rash  NERVOUS SYSTEM:  Awake and alert; Oriented  to place, person and time ; left arm power 2/5; left leg 4/5  Power intact right side    _________________________________________________  LABS:                        9.5    6.5   )-----------( 234      ( 28 Mar 2018 06:09 )             30.8     03-28    136  |  101  |  22<H>  ----------------------------<  134<H>  3.8   |  28  |  0.49<L>    Ca    8.7      28 Mar 2018 06:09  Phos  2.8     03-28  Mg     2.0     03-28    TPro  7.5  /  Alb  3.4<L>  /  TBili  0.3  /  DBili  x   /  AST  31  /  ALT  38  /  AlkPhos  74  03-27    PT/INR - ( 27 Mar 2018 16:23 )   PT: 11.3 sec;   INR: 1.04 ratio    PTT - ( 27 Mar 2018 16:23 )  PTT:34.9 sec      POCT Blood Glucose.: 162 mg/dL (28 Mar 2018 05:21)  POCT Blood Glucose.: 183 mg/dL (27 Mar 2018 17:05)        RADIOLOGY & ADDITIONAL TESTS:     MR Head No Cont (03.28.18 @ 14:18)  There is a small wedge-shaped focus of diffusion restriction with very faint early edema in the right half of the kelvin consistent with acute infarct. There is no mass effect or hemorrhage. There is mild to moderate   chronic microvascular ischemic change as expected for age with relative preservation of cortical volume. The orbital and sellar contents and cerebellar tonsils are within normal limits.    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

## 2018-03-28 NOTE — ED ADULT NURSE REASSESSMENT NOTE - NS ED NURSE REASSESS COMMENT FT1
Pt. offered bed pan multiple times throughout the shift. Pt. cleaned, changed, and repositioned. Pt. is on a continuos cardiac monitor awaiting bed.

## 2018-03-28 NOTE — CONSULT NOTE ADULT - SUBJECTIVE AND OBJECTIVE BOX
Patient is a 89y old  Female who presents with a chief complaint of Fall (27 Mar 2018 18:23)      HPI:  89 yo F , lives alone at home , AAO*3 , walks with help of a walker , PMH of DM , HTN , [place] palsy in past on right side of face,  was brought in by neighbour after an unwitnessed fall. Patient was last seen yesterday by neighbour , doing her daily chores. As per the patient , patient had a forward fall this morning and since then she could not get up until she was found on the floor by her Physical therapist and neighbour in afternoon , who decided to bring her here. As per neighbour , patient has had recent onset of forgetfulness ( last 2-3 months ).   Patient remembers the whole event , denies any loss of consciousness , did not hit her head. Denies any joint pain , nausea , vomiting ,diarrhea , recent sick contacts , fever , or any recent hospitalization.    In ED , BP : 190/84 mmhg , HR : 76 Temp : 97.4 F  Ct head was negative. Ct Spine reveled spine degenerative changes on multiple levels.   Troponin *1 mild elevated ( 0.07) , EKG : NSR ; Ua negative   CBC : Wbc count 10.8 with Left shift   CXR : cardiomegaly , dilation of aortic root with possible infiltrate on right side of lung (27 Mar 2018 18:23)         Neurological Review of Systems:  No difficulty with language.  No vision loss or double vision.  No dizziness, vertigo or new hearing loss.  No difficulty with speech or swallowing.  No focal weakness.  No focal sensory changes.  No numbness or tingling in the bilateral lower extremities.  No difficulty with balance.  No difficulty with ambulation.        MEDICATIONS  (STANDING):  aspirin  chewable 81 milliGRAM(s) Oral daily  atorvastatin 80 milliGRAM(s) Oral at bedtime  carvedilol 12.5 milliGRAM(s) Oral every 12 hours  enoxaparin Injectable 40 milliGRAM(s) SubCutaneous daily  insulin lispro (HumaLOG) corrective regimen sliding scale   SubCutaneous every 6 hours    MEDICATIONS  (PRN):    Allergies    No Known Allergies    Intolerances      PAST MEDICAL & SURGICAL HISTORY:  Bell's palsy  Diabetes  HTN (hypertension)  No significant past surgical history    FAMILY HISTORY:  No pertinent family history in first degree relatives    SOCIAL HISTORY: non smoker/ former smoker/ active smoker    Review of Systems:  Constitutional: No generalized weakness. No fevers or chills.                    Eyes, Ears, Mouth, Throat: No vision loss   Respiratory: No shortness of breath or cough.                                Cardiovascular: No chest pain or palpitations  Gastrointestinal: No nausea or vomiting.                                         Genitourinary: No urinary incontinence or burning on urination.  Musculoskeletal: No joint pain.                                                           Dermatologic: No rash.  Neurological: as per HPI                                                                      Psychiatric: No behavioral problems.  Endocrine: No known hypoglycemia.               Hematologic/Lymphatic: No easy bleeding.    O:  Vital Signs Last 24 Hrs  T(C): 37.1 (28 Mar 2018 07:13), Max: 37.1 (28 Mar 2018 07:13)  T(F): 98.8 (28 Mar 2018 07:13), Max: 98.8 (28 Mar 2018 07:13)  HR: 59 (28 Mar 2018 07:13) (58 - 76)  BP: 138/41 (28 Mar 2018 07:13) (111/48 - 190/84)  BP(mean): --  RR: 18 (28 Mar 2018 07:13) (18 - 18)  SpO2: 100% (28 Mar 2018 07:13) (98% - 100%)    General Exam:   General appearance: No acute distress                 Cardiovascular: Pedal dorsalis pulses intact bilaterally    Mental Status: Orientated to self, date and place.  Attention intact.  No dysarthria, aphasia or neglect.  Knowledge intact.  Registration intact.  Short and long term memory grossly intact.      Cranial Nerves: CN I - not tested.  PERRL, EOMI, VFF, no nystagmus or diplopia.  No APD.  Fundi not visualized.  CN V1-3 intact to light touch and pinprick.  No facial asymmetry.  Hearing intact to finger rub bilaterally.  Tongue, uvula and palate midline.  Sternocleidomastoid and Trapezius intact bilaterally.    Motor:   Tone: normal.                  Strength intact throughout  No pronator drift bilaterally                      No dysmetria on finger-nose-finger or heel-shin-heel  No truncal ataxia.  No resting, postural or action tremor.  No myoclonus.    Sensation: intact to light touch, pinprick, vibration and proprioception    Deep Tendon Reflexes: 1+ bilateral biceps, triceps, brachioradialis, knee and ankle  Toes flexor bilaterally    Gait: normal and stable.  Rhomberg -brendan.    Other:     LABS:                        9.5    6.5   )-----------( 234      ( 28 Mar 2018 06:09 )             30.8     -    136  |  101  |  22<H>  ----------------------------<  134<H>  3.8   |  28  |  0.49<L>    Ca    8.7      28 Mar 2018 06:09  Phos  2.8       Mg     2.0         TPro  7.5  /  Alb  3.4<L>  /  TBili  0.3  /  DBili  x   /  AST  31  /  ALT  38  /  AlkPhos  74      PT/INR - ( 27 Mar 2018 16:23 )   PT: 11.3 sec;   INR: 1.04 ratio         PTT - ( 27 Mar 2018 16:23 )  PTT:34.9 sec  Urinalysis Basic - ( 27 Mar 2018 16:23 )    Color: Yellow / Appearance: Clear / S.010 / pH: x  Gluc: x / Ketone: Trace  / Bili: Negative / Urobili: Negative   Blood: x / Protein: 100 / Nitrite: Negative   Leuk Esterase: Negative / RBC: 10-25 /HPF / WBC 0-2 /HPF   Sq Epi: x / Non Sq Epi: Moderate /HPF / Bacteria: Few /HPF          RADIOLOGY & ADDITIONAL STUDIES:    EKG:  < from: CT Head No Cont (18 @ 16:48) > 9iamges reviwed)    Impression:  1. Unremarkable noncontrast CT scan of the brain.        < end of copied text > Patient is a 89y old  Female who presents with a chief complaint of Fall (27 Mar 2018 18:23)      HPI:  89 yo F , lives alone at home , AAO*3 , walks with help of a walker , PMH of DM , HTN , [place] palsy in past on right side of face,  was brought in by neighbour after an unwitnessed fall. Patient was last seen yesterday by neighbor, doing her daily chores. As per the patient , patient had a forward fall yesterday morning and since then she could not get up until she was found on the floor by her Physical therapist and neighbour in afternoon , who decided to bring her here. As H&P, patient has had recent onset of forgetfulness ( last 2-3 months ).     The patient is unable to give reliable history.     Neurological Review of Systems: old right facial weakness due to bell's palsy, patient is unable to give further hx for to aphasia    MEDICATIONS  (STANDING):  aspirin  chewable 81 milliGRAM(s) Oral daily  atorvastatin 80 milliGRAM(s) Oral at bedtime  carvedilol 12.5 milliGRAM(s) Oral every 12 hours  enoxaparin Injectable 40 milliGRAM(s) SubCutaneous daily  insulin lispro (HumaLOG) corrective regimen sliding scale   SubCutaneous every 6 hours    MEDICATIONS  (PRN):    Allergies    No Known Allergies    Intolerances      PAST MEDICAL & SURGICAL HISTORY:  Bell's palsy  Diabetes  HTN (hypertension)  No significant past surgical history    FAMILY HISTORY:  No pertinent family history in first degree relatives    SOCIAL HISTORY: non smoker    Review of Systems:  Constitutional: No generalized weakness. No fevers or chills.                    Eyes, Ears, Mouth, Throat: No vision loss   Respiratory: No shortness of breath or cough.                                Cardiovascular: No chest pain or palpitations  Gastrointestinal: No nausea or vomiting.                                         Genitourinary: No urinary incontinence or burning on urination.  Musculoskeletal: No joint pain.                                                           Dermatologic: No rash.  Neurological: as per HPI                                                                      Psychiatric: No behavioral problems.  Endocrine: No known hypoglycemia.               Hematologic/Lymphatic: No easy bleeding.    O:  Vital Signs Last 24 Hrs  T(C): 37.1 (28 Mar 2018 07:13), Max: 37.1 (28 Mar 2018 07:13)  T(F): 98.8 (28 Mar 2018 07:13), Max: 98.8 (28 Mar 2018 07:13)  HR: 59 (28 Mar 2018 07:13) (58 - 76)  BP: 138/41 (28 Mar 2018 07:13) (111/48 - 190/84)  BP(mean): --  RR: 18 (28 Mar 2018 07:13) (18 - 18)  SpO2: 100% (28 Mar 2018 07:13) (98% - 100%)    General Exam:   General appearance: No acute distress                 Cardiovascular: Pedal dorsalis pulses intact bilaterally    Mental Status: Orientated to self, date and place.  Attention intact.  No dysarthria, aphasia or neglect.  Knowledge intact.  Registration intact.  Short and long term memory grossly intact.      Cranial Nerves: CN I - not tested.  PERRL, EOMI, VFF, no nystagmus or diplopia.  No APD.  Fundi not visualized.  CN V1-3 intact to light touch and pinprick.  No facial asymmetry.  Hearing intact to finger rub bilaterally.  Tongue, uvula and palate midline.  Sternocleidomastoid and Trapezius intact bilaterally.    Motor:   Tone: normal.                  Strength intact throughout  No pronator drift bilaterally                      No dysmetria on finger-nose-finger or heel-shin-heel  No truncal ataxia.  No resting, postural or action tremor.  No myoclonus.    Sensation: intact to light touch, pinprick, vibration and proprioception    Deep Tendon Reflexes: 1+ bilateral biceps, triceps, brachioradialis, knee and ankle  Toes flexor bilaterally    Gait: normal and stable.  Rhomberg -brendan.    Other:     LABS:                        9.5    6.5   )-----------( 234      ( 28 Mar 2018 06:09 )             30.8     03-    136  |  101  |  22<H>  ----------------------------<  134<H>  3.8   |  28  |  0.49<L>    Ca    8.7      28 Mar 2018 06:09  Phos  2.8     -  Mg     2.0     -    TPro  7.5  /  Alb  3.4<L>  /  TBili  0.3  /  DBili  x   /  AST  31  /  ALT  38  /  AlkPhos  74  03-27    PT/INR - ( 27 Mar 2018 16:23 )   PT: 11.3 sec;   INR: 1.04 ratio         PTT - ( 27 Mar 2018 16:23 )  PTT:34.9 sec  Urinalysis Basic - ( 27 Mar 2018 16:23 )    Color: Yellow / Appearance: Clear / S.010 / pH: x  Gluc: x / Ketone: Trace  / Bili: Negative / Urobili: Negative   Blood: x / Protein: 100 / Nitrite: Negative   Leuk Esterase: Negative / RBC: 10-25 /HPF / WBC 0-2 /HPF   Sq Epi: x / Non Sq Epi: Moderate /HPF / Bacteria: Few /HPF          RADIOLOGY & ADDITIONAL STUDIES:    EKG:  < from: CT Head No Cont (18 @ 16:48) > 9iamges reviwed)    Impression:  1. Unremarkable noncontrast CT scan of the brain.        < end of copied text > Patient is a 89y old  Female who presents with a chief complaint of Fall (27 Mar 2018 18:23)      HPI:  89 yo F , lives alone at home , AAO*3 , walks with help of a walker , PMH of DM , HTN , [place] palsy in past on right side of face,  was brought in by neighbour after an unwitnessed fall. Patient was last seen yesterday by neighbor, doing her daily chores. As per the patient , patient had a forward fall yesterday morning and since then she could not get up until she was found on the floor by her Physical therapist and neighbour in afternoon , who decided to bring her here. As H&P, patient has had recent onset of forgetfulness ( last 2-3 months ).     The patient is unable to give reliable history.     Neurological Review of Systems: old right facial weakness due to bell's palsy, patient is unable to give further hx for to aphasia    MEDICATIONS  (STANDING):  aspirin  chewable 81 milliGRAM(s) Oral daily  atorvastatin 80 milliGRAM(s) Oral at bedtime  carvedilol 12.5 milliGRAM(s) Oral every 12 hours  enoxaparin Injectable 40 milliGRAM(s) SubCutaneous daily  insulin lispro (HumaLOG) corrective regimen sliding scale   SubCutaneous every 6 hours    MEDICATIONS  (PRN):    Allergies    No Known Allergies    Intolerances      PAST MEDICAL & SURGICAL HISTORY:  Bell's palsy  Diabetes  HTN (hypertension)  No significant past surgical history    FAMILY HISTORY:  No pertinent family history in first degree relatives    SOCIAL HISTORY: non smoker    Review of Systems:  Constitutional: No fevers.                    Eyes, Ears, Mouth, Throat: No known vision loss   Respiratory: No known cough.                                Cardiovascular: No known chest pain.  Gastrointestinal: No known vomiting.                                         Genitourinary: No known burning on urination.  Musculoskeletal: No known  joint pain.                                                           Dermatologic: No known  rash.  Neurological: as per HPI                                                                      Psychiatric: No known behavioral problems.  Endocrine: No known hypoglycemia.               Hematologic/Lymphatic: No known easy bleeding.    O:  Vital Signs Last 24 Hrs  T(C): 37.1 (28 Mar 2018 07:13), Max: 37.1 (28 Mar 2018 07:13)  T(F): 98.8 (28 Mar 2018 07:13), Max: 98.8 (28 Mar 2018 07:13)  HR: 59 (28 Mar 2018 07:13) (58 - 76)  BP: 138/41 (28 Mar 2018 07:13) (111/48 - 190/84)  BP(mean): --  RR: 18 (28 Mar 2018 07:13) (18 - 18)  SpO2: 100% (28 Mar 2018 07:13) (98% - 100%)    General Exam:   General appearance: No acute distress                 Cardiovascular: Pedal dorsalis pulses intact bilaterally    Mental Status: Orientated to self, unable to assess date and place.  Attention visually intact.  + dysarthria, + aphasia. No neglect.  Unable to assess knowledge, Registration or memory.    Cranial Nerves: CN I - not tested.  PERRL, EOMI, VFF, no nystagmus or diplopia.  No APD.  Fundi not visualized.  CN V1-3 intact to light touch.  right peripheral facial weakness, left central facial weakness (1).  Hearing intact to finger rub bilaterally.  Tongue, uvula and palate midline.  Sternocleidomastoid and Trapezius intact bilaterally.    Motor:   Tone: decreased on the left arm and leg.                  Strength: decreased left arm and leg (3, 3)                    No dysmetria on finger-nose-finger or heel-shin-heel on the right    Sensation: decreased on left to deep pain    Deep Tendon Reflexes: 1+ right biceps, triceps, brachioradialis, knee and 0 on right  Toes flexor right and extensor on the left    Gait: unable to ambulate    Other:     LABS:                        9.5    6.5   )-----------( 234      ( 28 Mar 2018 06:09 )             30.8     03-28    136  |  101  |  22<H>  ----------------------------<  134<H>  3.8   |  28  |  0.49<L>    Ca    8.7      28 Mar 2018 06:09  Phos  2.8     03-28  Mg     2.0     -28    TPro  7.5  /  Alb  3.4<L>  /  TBili  0.3  /  DBili  x   /  AST  31  /  ALT  38  /  AlkPhos  74  03-27    PT/INR - ( 27 Mar 2018 16:23 )   PT: 11.3 sec;   INR: 1.04 ratio         PTT - ( 27 Mar 2018 16:23 )  PTT:34.9 sec  Urinalysis Basic - ( 27 Mar 2018 16:23 )    Color: Yellow / Appearance: Clear / S.010 / pH: x  Gluc: x / Ketone: Trace  / Bili: Negative / Urobili: Negative   Blood: x / Protein: 100 / Nitrite: Negative   Leuk Esterase: Negative / RBC: 10-25 /HPF / WBC 0-2 /HPF   Sq Epi: x / Non Sq Epi: Moderate /HPF / Bacteria: Few /HPF          RADIOLOGY & ADDITIONAL STUDIES:    EKG: NSR  < from: CT Head No Cont (18 @ 16:48) > 9iamges reviwed)    Impression:  1. Unremarkable noncontrast CT scan of the brain.        < end of copied text > Patient is a 89y old  Female who presents with a chief complaint of Fall (27 Mar 2018 18:23)      HPI:  89 yo F , lives alone at home , AAO*3 , walks with help of a walker , PMH of DM , HTN , [place] palsy in past on right side of face,  was brought in by neighbour after an unwitnessed fall. Patient was last seen yesterday by neighbor, doing her daily chores. As per the patient , patient had a forward fall yesterday morning and since then she could not get up until she was found on the floor by her Physical therapist and neighbour in afternoon , who decided to bring her here. As H&P, patient has had recent onset of forgetfulness ( last 2-3 months ).     The patient is unable to give reliable history.     Neurological Review of Systems: old right facial weakness due to bell's palsy, patient is unable to give further hx for to aphasia    MEDICATIONS  (STANDING):  aspirin  chewable 81 milliGRAM(s) Oral daily  atorvastatin 80 milliGRAM(s) Oral at bedtime  carvedilol 12.5 milliGRAM(s) Oral every 12 hours  enoxaparin Injectable 40 milliGRAM(s) SubCutaneous daily  insulin lispro (HumaLOG) corrective regimen sliding scale   SubCutaneous every 6 hours    MEDICATIONS  (PRN):    Allergies    No Known Allergies    Intolerances      PAST MEDICAL & SURGICAL HISTORY:  Bell's palsy  Diabetes  HTN (hypertension)  No significant past surgical history    FAMILY HISTORY:  No pertinent family history in first degree relatives    SOCIAL HISTORY: non smoker    Review of Systems:  Constitutional: No fevers.                    Eyes, Ears, Mouth, Throat: No known vision loss   Respiratory: No known cough.                                Cardiovascular: No known chest pain.  Gastrointestinal: No known vomiting.                                         Genitourinary: No known burning on urination.  Musculoskeletal: No known  joint pain.                                                           Dermatologic: No known  rash.  Neurological: as per HPI                                                                      Psychiatric: No known behavioral problems.  Endocrine: No known hypoglycemia.               Hematologic/Lymphatic: No known easy bleeding.    O:  Vital Signs Last 24 Hrs  T(C): 37.1 (28 Mar 2018 07:13), Max: 37.1 (28 Mar 2018 07:13)  T(F): 98.8 (28 Mar 2018 07:13), Max: 98.8 (28 Mar 2018 07:13)  HR: 59 (28 Mar 2018 07:13) (58 - 76)  BP: 138/41 (28 Mar 2018 07:13) (111/48 - 190/84)  BP(mean): --  RR: 18 (28 Mar 2018 07:13) (18 - 18)  SpO2: 100% (28 Mar 2018 07:13) (98% - 100%)    General Exam:   General appearance: No acute distress                 Cardiovascular: Pedal dorsalis pulses intact bilaterally    Mental Status: Orientated to self, unable to assess date and place.  Attention visually intact.  + dysarthria (1), + aphasia (2). No neglect.  Unable to assess knowledge, Registration or memory.    Cranial Nerves: CN I - not tested.  PERRL, EOMI, VFF, no nystagmus or diplopia.  No APD.  Fundi not visualized.  CN V1-3 intact to light touch.  right peripheral facial weakness, left central facial weakness (1).  Hearing intact to finger rub bilaterally.  Tongue, uvula and palate midline.  Sternocleidomastoid and Trapezius intact bilaterally.    Motor:   Tone: decreased on the left arm and leg.                  Strength: decreased left arm and leg (3, 3)                    No dysmetria on finger-nose-finger or heel-shin-heel on the right    Sensation: decreased on left to deep pain    Deep Tendon Reflexes: 1+ right biceps, triceps, brachioradialis, knee and 0 on right  Toes flexor right and extensor on the left    Gait: unable to ambulate    Other: nihss 10    LABS:                        9.5    6.5   )-----------( 234      ( 28 Mar 2018 06:09 )             30.8     03-28    136  |  101  |  22<H>  ----------------------------<  134<H>  3.8   |  28  |  0.49<L>    Ca    8.7      28 Mar 2018 06:09  Phos  2.8     03-  Mg     2.0     -    TPro  7.5  /  Alb  3.4<L>  /  TBili  0.3  /  DBili  x   /  AST  31  /  ALT  38  /  AlkPhos  74  03-27    PT/INR - ( 27 Mar 2018 16:23 )   PT: 11.3 sec;   INR: 1.04 ratio         PTT - ( 27 Mar 2018 16:23 )  PTT:34.9 sec  Urinalysis Basic - ( 27 Mar 2018 16:23 )    Color: Yellow / Appearance: Clear / S.010 / pH: x  Gluc: x / Ketone: Trace  / Bili: Negative / Urobili: Negative   Blood: x / Protein: 100 / Nitrite: Negative   Leuk Esterase: Negative / RBC: 10-25 /HPF / WBC 0-2 /HPF   Sq Epi: x / Non Sq Epi: Moderate /HPF / Bacteria: Few /HPF          RADIOLOGY & ADDITIONAL STUDIES:    EKG: NSR  < from: CT Head No Cont (18 @ 16:48) > 9iamges reviwed)    Impression:  1. Unremarkable noncontrast CT scan of the brain.        < end of copied text >

## 2018-03-28 NOTE — PROGRESS NOTE ADULT - PROBLEM SELECTOR PLAN 3
No evidence of Pneumonia clinically. Afebrile, No Leucocytosis  Procalcitonin is not a definitive diagnosis for underlying infection, adds to clinical impression only. No evidence of Pneumonia clinically. Afebrile, No Leucocytosis  Procalcitonin is not a definitive diagnosis for underlying infection, adds to clinical impression only in the appropriate

## 2018-03-28 NOTE — PROGRESS NOTE ADULT - PROBLEM SELECTOR PLAN 2
Likely fall due to stroke. d/w Neuro Dr. David evaluation appreciated  She recommend EEG to rule out Seizure activity as left arm weakness is more pronounced today but fall was yesterday.

## 2018-03-28 NOTE — PROGRESS NOTE ADULT - PROBLEM SELECTOR PLAN 7
Will discuss with patient once she is clinically stable  Patient has no immediate family to make decisions in the future  Will d/w her tomorrow and try to complete Health care Proxy

## 2018-03-28 NOTE — PROGRESS NOTE ADULT - ATTENDING COMMENTS
Discussed with patient findings and likely stroke  Discussed with NP Katya and RN ED Hold  Discussed with PGY1 EMANUEL Davidson and Neuro

## 2018-03-28 NOTE — CONSULT NOTE ADULT - ASSESSMENT
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     1. CVA: Care per neurology, continue aspirin and statin    2. HTN: Carvedilol 12.5mg Q12H    3. Fall: The likely culprit is the CVA and/or dysequilibrium, given that patient denies syncope, has otherwise unremarkable EKG and no significant arrhythmias on telemetry.  -However, given her murmur, would obtain echocardiogram to rule out significant valvular lesion and also check LVEF 89 yo F with DM and HTN who presents after with fall after acute pontine stroke.     1. CVA: Care per neurology, continue aspirin and statin    2. HTN: Carvedilol 12.5mg Q12H    3. Fall: The likely culprit is the CVA and/or dysequilibrium, given that patient denies syncope, has otherwise unremarkable EKG and no significant arrhythmias on telemetry.  -However, given her murmur, would obtain echocardiogram to rule out significant valvular lesion and also check LVEF    4. Possible dilated aorta seen on CXR: Would check echocardiogram to see if aortic root/ascending aorta is dilated; if so patient would need non-contrast CT aorta

## 2018-03-28 NOTE — CONSULT NOTE ADULT - ASSESSMENT
Patient found on floor suspicious for syncope on exam found to be aphasic and have left sided weakness suspicious for right MCA stroke    Recommend:  admit to tele  MRI and MRA head, CD, TTE  EEG  lipid profile and HbA1C  asa 81 and lipitor 40  permissive HTN SBP<180 today, <160 tomorrow and then normal  speech and swallow eval  PT  DVT ppx

## 2018-03-28 NOTE — CHART NOTE - NSCHARTNOTEFT_GEN_A_CORE
Spoke with Dr. 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

## 2018-03-28 NOTE — ED ADULT NURSE REASSESSMENT NOTE - NS ED NURSE REASSESS COMMENT FT1
assumed care of pt in holding area from MIRA Huynh. pt a&ox3, hard of hearing. admitted for weakness and dehydration. 20G left arm. on tele Box B, NSR.

## 2018-03-28 NOTE — PROGRESS NOTE ADULT - PROBLEM SELECTOR PLAN 5
Hold anti HTN for permissive HTN  Reduce Coreg dose to low dose as patient received this morning  Low salt low fat diet  Obtain medication list

## 2018-03-28 NOTE — PROGRESS NOTE ADULT - PROBLEM SELECTOR PLAN 1
Patient with slurred speech and left sided weakness with Acute Stroke on MRI  Continue ASA, Statin and Lovenox  PT evaluation  Bedside speech eval done. Able to tolerate apple sauce, mild cough with fluids.   Placed on Dysphagia Puree diet with thickened liquids only  Official swallow eval; d/w Therapist April  Continue Telemetry for monitoring for Arrhythmia like Atrial Fibrillation  Follow up Echo  Cardio evaluation. Patient with slurred speech and left sided weakness with Acute Stroke on MRI  Continue ASA, Statin and Lovenox  PT evaluation  Bedside speech eval done. Able to tolerate apple sauce, mild cough with fluids.   Placed on Dysphagia Puree diet with thickened liquids only  Official swallow eval; d/w Therapist April  Continue Telemetry for monitoring for Arrhythmia like Atrial Fibrillation  Follow up Echo  Cardio evaluation.  Reduce dose of Coreg for Permissive HTN due to acute CVA but will not hold due to risk of Rebound tachycardia

## 2018-03-29 ENCOUNTER — TRANSCRIPTION ENCOUNTER (OUTPATIENT)
Age: 83
End: 2018-03-29

## 2018-03-29 LAB
ALBUMIN SERPL ELPH-MCNC: 2.7 G/DL — LOW (ref 3.5–5)
ALP SERPL-CCNC: 61 U/L — SIGNIFICANT CHANGE UP (ref 40–120)
ALT FLD-CCNC: 30 U/L DA — SIGNIFICANT CHANGE UP (ref 10–60)
ANION GAP SERPL CALC-SCNC: 6 MMOL/L — SIGNIFICANT CHANGE UP (ref 5–17)
AST SERPL-CCNC: 35 U/L — SIGNIFICANT CHANGE UP (ref 10–40)
BASOPHILS # BLD AUTO: 0.1 K/UL — SIGNIFICANT CHANGE UP (ref 0–0.2)
BASOPHILS NFR BLD AUTO: 1.4 % — SIGNIFICANT CHANGE UP (ref 0–2)
BILIRUB SERPL-MCNC: 0.3 MG/DL — SIGNIFICANT CHANGE UP (ref 0.2–1.2)
BUN SERPL-MCNC: 13 MG/DL — SIGNIFICANT CHANGE UP (ref 7–18)
CALCIUM SERPL-MCNC: 8.3 MG/DL — LOW (ref 8.4–10.5)
CHLORIDE SERPL-SCNC: 105 MMOL/L — SIGNIFICANT CHANGE UP (ref 96–108)
CO2 SERPL-SCNC: 28 MMOL/L — SIGNIFICANT CHANGE UP (ref 22–31)
CREAT SERPL-MCNC: 0.59 MG/DL — SIGNIFICANT CHANGE UP (ref 0.5–1.3)
EOSINOPHIL # BLD AUTO: 0 K/UL — SIGNIFICANT CHANGE UP (ref 0–0.5)
EOSINOPHIL NFR BLD AUTO: 0.5 % — SIGNIFICANT CHANGE UP (ref 0–6)
GLUCOSE BLDC GLUCOMTR-MCNC: 110 MG/DL — HIGH (ref 70–99)
GLUCOSE BLDC GLUCOMTR-MCNC: 156 MG/DL — HIGH (ref 70–99)
GLUCOSE BLDC GLUCOMTR-MCNC: 157 MG/DL — HIGH (ref 70–99)
GLUCOSE BLDC GLUCOMTR-MCNC: 248 MG/DL — HIGH (ref 70–99)
GLUCOSE BLDC GLUCOMTR-MCNC: 303 MG/DL — HIGH (ref 70–99)
GLUCOSE SERPL-MCNC: 109 MG/DL — HIGH (ref 70–99)
HCT VFR BLD CALC: 30 % — LOW (ref 34.5–45)
HGB BLD-MCNC: 9.6 G/DL — LOW (ref 11.5–15.5)
LYMPHOCYTES # BLD AUTO: 1.4 K/UL — SIGNIFICANT CHANGE UP (ref 1–3.3)
LYMPHOCYTES # BLD AUTO: 31.2 % — SIGNIFICANT CHANGE UP (ref 13–44)
MCHC RBC-ENTMCNC: 30.9 PG — SIGNIFICANT CHANGE UP (ref 27–34)
MCHC RBC-ENTMCNC: 32 GM/DL — SIGNIFICANT CHANGE UP (ref 32–36)
MCV RBC AUTO: 96.4 FL — SIGNIFICANT CHANGE UP (ref 80–100)
MONOCYTES # BLD AUTO: 0.4 K/UL — SIGNIFICANT CHANGE UP (ref 0–0.9)
MONOCYTES NFR BLD AUTO: 10 % — SIGNIFICANT CHANGE UP (ref 2–14)
NEUTROPHILS # BLD AUTO: 2.5 K/UL — SIGNIFICANT CHANGE UP (ref 1.8–7.4)
NEUTROPHILS NFR BLD AUTO: 57 % — SIGNIFICANT CHANGE UP (ref 43–77)
PLATELET # BLD AUTO: 225 K/UL — SIGNIFICANT CHANGE UP (ref 150–400)
POTASSIUM SERPL-MCNC: 4.1 MMOL/L — SIGNIFICANT CHANGE UP (ref 3.5–5.3)
POTASSIUM SERPL-SCNC: 4.1 MMOL/L — SIGNIFICANT CHANGE UP (ref 3.5–5.3)
PROT SERPL-MCNC: 6.2 G/DL — SIGNIFICANT CHANGE UP (ref 6–8.3)
RBC # BLD: 3.11 M/UL — LOW (ref 3.8–5.2)
RBC # FLD: 13.7 % — SIGNIFICANT CHANGE UP (ref 10.3–14.5)
SODIUM SERPL-SCNC: 139 MMOL/L — SIGNIFICANT CHANGE UP (ref 135–145)
TRANSFERRIN SERPL-MCNC: 214 MG/DL — SIGNIFICANT CHANGE UP (ref 200–360)
WBC # BLD: 4.3 K/UL — SIGNIFICANT CHANGE UP (ref 3.8–10.5)
WBC # FLD AUTO: 4.3 K/UL — SIGNIFICANT CHANGE UP (ref 3.8–10.5)

## 2018-03-29 PROCEDURE — 99233 SBSQ HOSP IP/OBS HIGH 50: CPT | Mod: GC

## 2018-03-29 PROCEDURE — 95819 EEG AWAKE AND ASLEEP: CPT | Mod: 26

## 2018-03-29 RX ORDER — POLYETHYLENE GLYCOL 3350 17 G/17G
17 POWDER, FOR SOLUTION ORAL ONCE
Qty: 0 | Refills: 0 | Status: COMPLETED | OUTPATIENT
Start: 2018-03-29 | End: 2018-03-29

## 2018-03-29 RX ADMIN — ENOXAPARIN SODIUM 40 MILLIGRAM(S): 100 INJECTION SUBCUTANEOUS at 12:13

## 2018-03-29 RX ADMIN — CARVEDILOL PHOSPHATE 3.12 MILLIGRAM(S): 80 CAPSULE, EXTENDED RELEASE ORAL at 05:32

## 2018-03-29 RX ADMIN — ATORVASTATIN CALCIUM 80 MILLIGRAM(S): 80 TABLET, FILM COATED ORAL at 21:36

## 2018-03-29 RX ADMIN — POLYETHYLENE GLYCOL 3350 17 GRAM(S): 17 POWDER, FOR SOLUTION ORAL at 18:40

## 2018-03-29 RX ADMIN — Medication 1: at 17:18

## 2018-03-29 RX ADMIN — Medication 1: at 01:25

## 2018-03-29 RX ADMIN — CARVEDILOL PHOSPHATE 3.12 MILLIGRAM(S): 80 CAPSULE, EXTENDED RELEASE ORAL at 17:19

## 2018-03-29 RX ADMIN — Medication 81 MILLIGRAM(S): at 12:13

## 2018-03-29 RX ADMIN — Medication 4: at 12:14

## 2018-03-29 NOTE — PHYSICAL THERAPY INITIAL EVALUATION ADULT - IMPAIRED TRANSFERS: BED/CHAIR, REHAB EVAL
impaired postural control/abnormal muscle tone/decreased strength/impaired motor control/impaired balance/decreased flexibility

## 2018-03-29 NOTE — SWALLOW BEDSIDE ASSESSMENT ADULT - SLP PERTINENT HISTORY OF CURRENT PROBLEM
89 F, lives alone at home, AAOx3, ambulates w/ walker, PMH of DM, HTN, Hx of Bell's Palsy, ?Dementia (2-3 months of forgetfulness) p/w unwitnessed fall - found to have a R pontine infarct - admitted for management of CVA

## 2018-03-29 NOTE — PROGRESS NOTE ADULT - ATTENDING COMMENTS
Patient seen and examined eariler today around 12.30 PM;  Agree with PGY1 A/P above with editing as needed. d/w Dr. Sin    patient is doing better; alert and awake; folowed commands; Left sided weakness; Lives alone; No family; Has a friend neighbor cuong    Vitals: reviewed; stable    P/E: As above;  Left arm 3/5; Right sifde intact  Facial droop but prior Bell's palsy    Labs: as above    EEG Impression / Clinical Correlate:  Abnormal EEG in the awake and drowsy states.  1. Mild diffuse or multifocal cerebral dysfunction   2. Functional abnormality in the left temporal region   3. There were no epileptiform abnormalities recorded.        D/d:  Acute CVA with left sided weakness  DM, well controlled  HTn controlled  Social situation    Plan:  Continue Tele for monitor for A.Fib  cardio f/u; Await Echo  Continue low dose Coreg for permissive HTN;    May increase dose in AM, more than 48 to 72 hrs after CVA  PT eval may need STR  Discussed with patient, she is AAO x 3;   Discussed with her decisions in futureif she cannot; She wants her Neighbor Cuong who she trust to make decision for her if she is unable tyo  Discussed with  and PGY1 will request JUMA York to fill up Health care Proxy  Will further address her wishes in next 24 hrs  d/w Speech pathologist, Swallow eval appreciated; Advanced to Pureed with thin liquids; No aspiration but will still maintain precautions  Continue ASa, Statin, Hepatin for DVT ppx  Patient will not benefit form Surgical intervention    Discussed with PGY1 Dr. Sin and case Manger  PT and Rehab placement  Follow up with Endovascular Sx outpatient  Neurology evaluation and input appreciated  EEG as per Neuro negative

## 2018-03-29 NOTE — SWALLOW BEDSIDE ASSESSMENT ADULT - ORAL PREPARATORY PHASE
impaired labial seal on spoon Within functional limits Impaired labial pursing on cup sip resulting in anterior spillage Impaired labial seal on spoon/Decreased mastication ability impaired labial pucker on cup sip

## 2018-03-29 NOTE — DISCHARGE NOTE ADULT - MEDICATION SUMMARY - MEDICATIONS TO TAKE
I will START or STAY ON the medications listed below when I get home from the hospital:    aspirin 81 mg oral tablet, chewable  -- 1 tab(s) by mouth once a day  -- Indication: For Acute CVA (cerebrovascular accident)    losartan 100 mg oral tablet  -- 1 tab(s) by mouth once a day  -- Indication: For HTN (hypertension)    metFORMIN 500 mg oral tablet  -- 1 tab(s) by mouth 2 times a day  -- Indication: For Diabetes    atorvastatin 80 mg oral tablet  -- 1 tab(s) by mouth once a day (at bedtime)  -- Indication: For Cerebrovascular accident (CVA) due to other mechanism    carvedilol 25 mg oral tablet  -- 1 tab(s) by mouth every 12 hours  -- Indication: For HTN (hypertension)

## 2018-03-29 NOTE — PROGRESS NOTE ADULT - PROBLEM SELECTOR PLAN 4
HgA1c 6.8; FS well controlled on sliding scale HgA1c 6.8; FS well controlled on sliding scale  - Takes Metformin 250 once daily at home

## 2018-03-29 NOTE — PHYSICAL THERAPY INITIAL EVALUATION ADULT - GENERAL OBSERVATIONS, REHAB EVAL
Pt. found lying supine; on telemetry; alert and oriented X 2; following verbal commands and cooperative. Pt. exhibits left sided bell's palsy.

## 2018-03-29 NOTE — PROGRESS NOTE ADULT - PROBLEM SELECTOR PLAN 5
Plan as above for permissive HTN x 48 hours  - BP acceptable 130/50 - 189/76  ***Pending medication list from Rx Plan as above for permissive HTN x 48 hours  - BP acceptable 130/50 - 189/76  ***Home medications included Coreg and Losartan; updated medication reconciliation

## 2018-03-29 NOTE — SWALLOW BEDSIDE ASSESSMENT ADULT - ORAL PHASE
Within functional limits Pt had difficulty clearing residue with tongue sweep due to reduced lingual strength/Stasis in lateral sulci/Lingual stasis

## 2018-03-29 NOTE — PHYSICAL THERAPY INITIAL EVALUATION ADULT - DIAGNOSIS, PT EVAL
Overall decline in functional mobility due to weakness particularly left UE and LE; decreased balance and endurance.

## 2018-03-29 NOTE — SWALLOW BEDSIDE ASSESSMENT ADULT - ASR SWALLOW ASPIRATION MONITOR
change of breathing pattern/position upright (90Y)/oral hygiene/fever/pneumonia/throat clearing/upper respiratory infection/cough/gurgly voice

## 2018-03-29 NOTE — DISCHARGE NOTE ADULT - PLAN OF CARE
You presented after a unwitnessed fall and an MRI showed that you had an acute R pontine infarct which explains your left sided weakness. 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 Burciaga who recommends outpatient follow up for continued work up and interventions as needed. An EEG was done and negative for any seizure activity. The physical therapist recommended subacute rehab in which you agreed. The swallow pathologist recommends a pureed diet and thin liquids to prevent your risk of aspiration. You have a history of diabetes that is well controlled with a hemoglobin A1c of 6.8 - You should take an increased dose of your home medication Metformin from 250 once a day to 500 twice daily. If tolerated the dose should be increased to 1000 mg twice daily. Please follow up with your primary care provider and continue to monitor your blood sugar levels. Your home blood pressure medications were held and you are advised to continue with Coreg at 3.125 mg twice daily. If you notice any dizziness or lightheadedness upon standing, please follow up with your primary care provider. If you notice any sudden severe headaches, palpitations, or weakness, please proceed to your nearest emergency department or call 911. The attending Dr. Choi and the  met with you to discuss goals of care - you completed a health care proxy form stating that in the event you are unable to make medical decisions that your friend Jeferson José Miguel [phone] should be contacted. Please take your medications as prescribed and follow up with your primary care physician within a week from discharge Your home blood pressure medications were held and you are advised to continue with Coreg at 25 mg twice daily. If you notice any dizziness or lightheadedness upon standing, please follow up with your primary care provider. If you notice any sudden severe headaches, palpitations, or weakness, please proceed to your nearest emergency department or call 911.

## 2018-03-29 NOTE — PHYSICAL THERAPY INITIAL EVALUATION ADULT - IMPAIRMENTS CONTRIBUTING IMPAIRED BED MOBILITY, REHAB EVAL
impaired postural control/abnormal muscle tone/decreased strength/impaired motor control/decreased flexibility/impaired balance

## 2018-03-29 NOTE — SWALLOW BEDSIDE ASSESSMENT ADULT - SWALLOW EVAL: DIAGNOSIS
Pt p/w s&s of oral dysphagia c/b impaired labial seal, impaired mastication ability, oral residue on mechanical soft solids, timely & adequate AP transport & pharyngeal swallow. While no overt s&s of aspiration, silent aspiration can not be ruled out.

## 2018-03-29 NOTE — DISCHARGE NOTE ADULT - PATIENT PORTAL LINK FT
You can access the [facility] Patient Portal, offered by [facility], by registering with the following website: [email or web address]

## 2018-03-29 NOTE — PHYSICAL THERAPY INITIAL EVALUATION ADULT - PERTINENT HX OF CURRENT PROBLEM, REHAB EVAL
Pt. was brought in by neighbor after an unwitnessed fall. MRI of brain showed  Acute bland right pontine infarct.

## 2018-03-29 NOTE — SWALLOW BEDSIDE ASSESSMENT ADULT - CONSISTENCIES ADMINISTERED
puree/applesauce x3 honey thick/x 3 x 3/nectar thick applesauce + cracker x1/mech soft thin liquid/water x3

## 2018-03-29 NOTE — PROGRESS NOTE ADULT - SUBJECTIVE AND OBJECTIVE BOX
PGY 1 Note discussed with supervising resident and primary attending    Patient is a 89y old  Female who presents with a chief complaint of Fall (27 Mar 2018 18:23)      INTERVAL HPI/OVERNIGHT EVENTS: Patient seen and examined at bedside with no new complaints    MEDICATIONS  (STANDING):  aspirin  chewable 81 milliGRAM(s) Oral daily  atorvastatin 80 milliGRAM(s) Oral at bedtime  carvedilol 3.125 milliGRAM(s) Oral every 12 hours  enoxaparin Injectable 40 milliGRAM(s) SubCutaneous daily  insulin lispro (HumaLOG) corrective regimen sliding scale   SubCutaneous every 6 hours  sodium chloride 0.9% with potassium chloride 20 mEq/L 1000 milliLiter(s) (55 mL/Hr) IV Continuous <Continuous>    MEDICATIONS  (PRN):      __________________________________________________  REVIEW OF SYSTEMS:  RESPIRATORY: No cough; No shortness of breath  CARDIOVASCULAR: No chest pain, no palpitations  GASTROINTESTINAL: No pain. No nausea or vomiting; No diarrhea       Vital Signs Last 24 Hrs  T(C): 36.7 (29 Mar 2018 05:37), Max: 37.1 (28 Mar 2018 07:13)  T(F): 98.1 (29 Mar 2018 05:37), Max: 98.8 (28 Mar 2018 07:13)  HR: 62 (29 Mar 2018 05:37) (55 - 68)  BP: 162/61 (29 Mar 2018 05:37) (130/50 - 189/76)  BP(mean): --  RR: 18 (29 Mar 2018 05:37) (17 - 20)  SpO2: 100% (29 Mar 2018 05:37) (98% - 100%)    ________________________________________________  PHYSICAL EXAM:  GENERAL: NAD  HEENT: Normocephalic;  conjunctivae and sclerae clear; moist mucous membranes;   NECK : supple  CHEST/LUNG: Clear to auscultation bilaterally with good air entry   HEART: S1 S2  regular; no murmurs, gallops or rubs  ABDOMEN: Soft, Nontender, Nondistended; Bowel sounds present  EXTREMITIES: No cyanosis; no edema; no calf tenderness, R elbow pain  NERVOUS SYSTEM:  Awake and alert; Oriented  to place, person and time ; no new deficits; prior L sided weakness, arms > legs    _________________________________________________  LABS:                        9.5    6.5   )-----------( 234      ( 28 Mar 2018 06:09 )             30.8     03-28    136  |  101  |  22<H>  ----------------------------<  134<H>  3.8   |  28  |  0.49<L>    Ca    8.7      28 Mar 2018 06:09  Phos  2.8     -  Mg     2.0         TPro  7.5  /  Alb  3.4<L>  /  TBili  0.3  /  DBili  x   /  AST  31  /  ALT  38  /  AlkPhos  74  03-27    PT/INR - ( 27 Mar 2018 16:23 )   PT: 11.3 sec;   INR: 1.04 ratio         PTT - ( 27 Mar 2018 16:23 )  PTT:34.9 sec  Urinalysis Basic - ( 27 Mar 2018 16:23 )    Color: Yellow / Appearance: Clear / S.010 / pH: x  Gluc: x / Ketone: Trace  / Bili: Negative / Urobili: Negative   Blood: x / Protein: 100 / Nitrite: Negative   Leuk Esterase: Negative / RBC: 10-25 /HPF / WBC 0-2 /HPF   Sq Epi: x / Non Sq Epi: Moderate /HPF / Bacteria: Few /HPF      CAPILLARY BLOOD GLUCOSE      POCT Blood Glucose.: 110 mg/dL (29 Mar 2018 06:00)  POCT Blood Glucose.: 156 mg/dL (29 Mar 2018 00:16)  POCT Blood Glucose.: 213 mg/dL (28 Mar 2018 21:42)  POCT Blood Glucose.: 153 mg/dL (28 Mar 2018 18:05)        RADIOLOGY & ADDITIONAL TESTS:    Imaging Personally Reviewed:  YES    Consultant(s) Notes Reviewed:   YES    Care Discussed with Consultants : YES    Plan of care was discussed with patient and /or primary care giver; all questions and concerns were addressed and care was aligned with patient's wishes. PGY 1 Note discussed with supervising resident and primary attending    Patient is a 89y old  Female who presents with a chief complaint of Fall (27 Mar 2018 18:23)      INTERVAL HPI/OVERNIGHT EVENTS: Patient seen and examined at bedside with no new complaints    MEDICATIONS  (STANDING):  aspirin  chewable 81 milliGRAM(s) Oral daily  atorvastatin 80 milliGRAM(s) Oral at bedtime  carvedilol 3.125 milliGRAM(s) Oral every 12 hours  enoxaparin Injectable 40 milliGRAM(s) SubCutaneous daily  insulin lispro (HumaLOG) corrective regimen sliding scale   SubCutaneous every 6 hours  sodium chloride 0.9% with potassium chloride 20 mEq/L 1000 milliLiter(s) (55 mL/Hr) IV Continuous <Continuous>    MEDICATIONS  (PRN):      __________________________________________________  REVIEW OF SYSTEMS:  RESPIRATORY: No cough; No shortness of breath  CARDIOVASCULAR: No chest pain, no palpitations  GASTROINTESTINAL: No pain. No nausea or vomiting; No diarrhea       Vital Signs Last 24 Hrs  T(C): 36.7 (29 Mar 2018 05:37), Max: 37.1 (28 Mar 2018 07:13)  T(F): 98.1 (29 Mar 2018 05:37), Max: 98.8 (28 Mar 2018 07:13)  HR: 62 (29 Mar 2018 05:37) (55 - 68)  BP: 162/61 (29 Mar 2018 05:37) (130/50 - 189/76)  BP(mean): --  RR: 18 (29 Mar 2018 05:37) (17 - 20)  SpO2: 100% (29 Mar 2018 05:37) (98% - 100%)    ________________________________________________  PHYSICAL EXAM:  GENERAL: NAD  HEENT: Normocephalic;  conjunctivae and sclerae clear; moist mucous membranes;   NECK : supple  CHEST/LUNG: Clear to auscultation bilaterally with good air entry   HEART: S1 S2  regular; no murmurs, gallops or rubs  ABDOMEN: Soft, Nontender, Nondistended; Bowel sounds present  EXTREMITIES: No cyanosis; no edema; no calf tenderness, R elbow pain, L arm strength 3/5  NERVOUS SYSTEM:  Awake and alert; Oriented  to place, person and time ; no new deficits; prior L sided weakness, arms > legs    _________________________________________________  LABS:                        9.5    6.5   )-----------( 234      ( 28 Mar 2018 06:09 )             30.8     03-28    136  |  101  |  22<H>  ----------------------------<  134<H>  3.8   |  28  |  0.49<L>    Ca    8.7      28 Mar 2018 06:09  Phos  2.8       Mg     2.0         TPro  7.5  /  Alb  3.4<L>  /  TBili  0.3  /  DBili  x   /  AST  31  /  ALT  38  /  AlkPhos  74  03-27    PT/INR - ( 27 Mar 2018 16:23 )   PT: 11.3 sec;   INR: 1.04 ratio         PTT - ( 27 Mar 2018 16:23 )  PTT:34.9 sec  Urinalysis Basic - ( 27 Mar 2018 16:23 )    Color: Yellow / Appearance: Clear / S.010 / pH: x  Gluc: x / Ketone: Trace  / Bili: Negative / Urobili: Negative   Blood: x / Protein: 100 / Nitrite: Negative   Leuk Esterase: Negative / RBC: 10-25 /HPF / WBC 0-2 /HPF   Sq Epi: x / Non Sq Epi: Moderate /HPF / Bacteria: Few /HPF      CAPILLARY BLOOD GLUCOSE      POCT Blood Glucose.: 110 mg/dL (29 Mar 2018 06:00)  POCT Blood Glucose.: 156 mg/dL (29 Mar 2018 00:16)  POCT Blood Glucose.: 213 mg/dL (28 Mar 2018 21:42)  POCT Blood Glucose.: 153 mg/dL (28 Mar 2018 18:05)        RADIOLOGY & ADDITIONAL TESTS:    Imaging Personally Reviewed:  YES    Consultant(s) Notes Reviewed:   YES    Care Discussed with Consultants : YES    Plan of care was discussed with patient and /or primary care giver; all questions and concerns were addressed and care was aligned with patient's wishes.

## 2018-03-29 NOTE — PHYSICAL THERAPY INITIAL EVALUATION ADULT - 30 SECOND CHAIR STAND TEST, REHAB EVAL
According to 30 second chair stand test, patient's score is at 0 which reveals significant functional weakness and puts the patient at risk for falls. Based on the score mentioned above, patient is unsafe to be discharged to home and will greatly benefit from sub-acute rehab placement.

## 2018-03-29 NOTE — DISCHARGE NOTE ADULT - HOSPITAL COURSE
89 F, lives alone at home, AAOx3, ambulates w/ walker, PMH of DM, HTN, Hx of Bell's Palsy (R face), ?Dementia (2-3 months of forgetfulness) p/w unwitnessed fall - found to have a R pontine infarct - admitted for management of CVA. On the 2nd day of admission patient found w/ slurred speech and left sided weakness. MRI confirms R pontine infarct. MRA shows moderate severe proximal and precavernous L ICA stenosis; as per neurology Dr. David, NSG Dr. Burciaga recommends outpatient f/u with endovascular for CTA. Pt treated w/ ASA, Statin, and Lovenox; telemetry for r/o Afib. Pt had permissive HTN x 48 total hours. Afterwards increasing BP meds for elevated BP 170s; C/w Coreg 25 BID and increased Losartan 100 daily. PT recommend JB; patient agrees. Swallow evaluation noted; Pureed + Thin. TTE normal EF, G1DD. EEG was done 2/2 to initial concern for syncope and negative for Sz. Patient seen and examined at bedside with no acute complaints. The medical plan and results were discussed with the patient throughout the hospital course. Patient is medically clear for discharge and is advised to follow up with his primary care physician within a week for continued medical care. Please see above and the medical records for additional information.

## 2018-03-29 NOTE — SWALLOW BEDSIDE ASSESSMENT ADULT - COMMENTS
Pt HOB elevated to 90 degrees, AA+Ox3, able to follow directives & respond to queries regarding name, current location, & time. Pt.'s friend was present. Malnutrition risk present as pt was observed to be cachectic. Pt verbally expressed a preference for pureed foods vs. a mechanical soft diet. PO trial of thin liquids cleared oral residue from mechanical soft PO trial

## 2018-03-29 NOTE — SWALLOW BEDSIDE ASSESSMENT ADULT - SWALLOW EVAL: RECOMMENDED FEEDING/EATING TECHNIQUES
allow for swallow between intakes/oral hygiene/small sips/bites/check mouth frequently for oral residue/pocketing/position upright (90 degrees)/maintain upright posture during/after eating for 30 mins/alternate food with liquid

## 2018-03-29 NOTE — PHYSICAL THERAPY INITIAL EVALUATION ADULT - CRITERIA FOR SKILLED THERAPEUTIC INTERVENTIONS
impairments found/functional limitations in following categories/therapy frequency/risk reduction/prevention/rehab potential/anticipated discharge recommendation/predicted duration of therapy intervention

## 2018-03-29 NOTE — PHYSICAL THERAPY INITIAL EVALUATION ADULT - IMPAIRED TRANSFERS: SIT/STAND, REHAB EVAL
impaired postural control/abnormal muscle tone/decreased strength/decreased flexibility/impaired balance

## 2018-03-29 NOTE — DISCHARGE NOTE ADULT - CARE PLAN
Principal Discharge DX:	Acute CVA (cerebrovascular accident)  Goal:	Please take your medications as prescribed and follow up with your primary care physician within a week from discharge  Assessment and plan of treatment:	You presented after a unwitnessed fall and an MRI showed that you had an acute R pontine infarct which explains your left sided weakness. 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 Burciaga who recommends outpatient follow up for continued work up and interventions as needed. An EEG was done and negative for any seizure activity. The physical therapist recommended subacute rehab in which you agreed. The swallow pathologist recommends a pureed diet and thin liquids to prevent your risk of aspiration.  Secondary Diagnosis:	Diabetes  Assessment and plan of treatment:	You have a history of diabetes that is well controlled with a hemoglobin A1c of 6.8 - You should take an increased dose of your home medication Metformin from 250 once a day to 500 twice daily. If tolerated the dose should be increased to 1000 mg twice daily. Please follow up with your primary care provider and continue to monitor your blood sugar levels.  Secondary Diagnosis:	HTN (hypertension)  Assessment and plan of treatment:	Your home blood pressure medications were held and you are advised to continue with Coreg at 3.125 mg twice daily. If you notice any dizziness or lightheadedness upon standing, please follow up with your primary care provider. If you notice any sudden severe headaches, palpitations, or weakness, please proceed to your nearest emergency department or call 911.  Secondary Diagnosis:	Goals of care, counseling/discussion  Assessment and plan of treatment:	The attending Dr. Choi and the  met with you to discuss goals of care - you completed a health care proxy form stating that in the event you are unable to make medical decisions that your friend Jeferson Valdez [phone] should be contacted. Principal Discharge DX:	Acute CVA (cerebrovascular accident)  Goal:	Please take your medications as prescribed and follow up with your primary care physician within a week from discharge  Assessment and plan of treatment:	You presented after a unwitnessed fall and an MRI showed that you had an acute R pontine infarct which explains your left sided weakness. 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 Burciaga who recommends outpatient follow up for continued work up and interventions as needed. An EEG was done and negative for any seizure activity. The physical therapist recommended subacute rehab in which you agreed. The swallow pathologist recommends a pureed diet and thin liquids to prevent your risk of aspiration.  Secondary Diagnosis:	Diabetes  Assessment and plan of treatment:	You have a history of diabetes that is well controlled with a hemoglobin A1c of 6.8 - You should take an increased dose of your home medication Metformin from 250 once a day to 500 twice daily. If tolerated the dose should be increased to 1000 mg twice daily. Please follow up with your primary care provider and continue to monitor your blood sugar levels.  Secondary Diagnosis:	HTN (hypertension)  Assessment and plan of treatment:	Your home blood pressure medications were held and you are advised to continue with Coreg at 25 mg twice daily. If you notice any dizziness or lightheadedness upon standing, please follow up with your primary care provider. If you notice any sudden severe headaches, palpitations, or weakness, please proceed to your nearest emergency department or call 911.  Secondary Diagnosis:	Goals of care, counseling/discussion  Assessment and plan of treatment:	The attending Dr. Choi and the  met with you to discuss goals of care - you completed a health care proxy form stating that in the event you are unable to make medical decisions that your friend Jeferson Valdez [phone] should be contacted.

## 2018-03-29 NOTE — DISCHARGE NOTE ADULT - MEDICATION SUMMARY - MEDICATIONS TO CHANGE
I will SWITCH the dose or number of times a day I take the medications listed below when I get home from the hospital:    Coreg 12.5 mg oral tablet  -- 1 tab(s) by mouth 2 times a day    Coreg 12.5 mg oral tablet  -- 1 tab(s) by mouth 2 times a day    losartan  -- 50 milligram(s) by mouth once a day    metFORMIN 500 mg oral tablet  -- 0.5 tab(s) by mouth once a day

## 2018-03-29 NOTE — PROGRESS NOTE ADULT - PROBLEM SELECTOR PLAN 7
No immediate family to make decisions in the future  - Plan to complete HCP form No immediate family to make decisions in the future  - HCP form completed.

## 2018-03-29 NOTE — PROGRESS NOTE ADULT - PROBLEM SELECTOR PLAN 1
P/w slurred speech and left sided weakness  - MRI confirms R pontine infarct  - MRA shows moderate severe proximal and precavernous ICA stenosis; as per neurology Dr. David, Dr. Burciaga recommends outpatient f/u with endovascular for CTA  - C/w ASA, Statin, and Lovenox; telemetry for r/o Afib  - C/w LD of Coreg (prevent rebound tachycardia) for Permissive HTN x 48 total hours   ***Pending PT evaluation, official swallow evaluation, TTE/cardiac evaluation P/w slurred speech and left sided weakness  - MRI confirms R pontine infarct  - MRA shows moderate severe proximal and precavernous L ICA stenosis; as per neurology Dr. David, NSG Dr. Burciaga recommends outpatient f/u with endovascular for CTA  - C/w ASA, Statin, and Lovenox; telemetry for r/o Afib  - C/w LD of Coreg (prevent rebound tachycardia) for Permissive HTN x 48 total hours; may titrate in AM as BP tolerates  - PT recommend JB; patient agrees  - Swallow evaluation noted; Pureed + Thin  ***Pending TTE and cardiac f/u

## 2018-03-29 NOTE — PHYSICAL THERAPY INITIAL EVALUATION ADULT - TRANSFER SAFETY CONCERNS NOTED: SIT/STAND, REHAB EVAL
decreased balance during turns/decreased weight-shifting ability/losing balance/decreased step length/decreased sequencing ability

## 2018-03-29 NOTE — EEG REPORT - NS EEG TEXT BOX
[facility]  Report of Routine EEG     [facility]: [address], [address] [place] [address], Phone: [phone]  [facility]: [address] [place] [address], Phone: [phone]  Office: 1 [facility], [facility] [address], [place] [address], Phone: [phone]    Patient Name: SINCERE SULTANA    Age: 89 y  : 1926  Referring Physician: DR FREEMAN    EEG #:   Study Date: 3/29/2018		    Technical Information:					  Placement and Labeling of Electrodes:  The EEG was performed utilizing 20 channels referential EEG connections (coronal over temporal over parasagittal montage) using all standard 10-20 electrode placements with EKG.  Recording was at a sampling rate of 256 samples per second per channel.  Time synchronized digital video recording was done simultaneously with EEG recording.  A low light infrared camera was used for low light recording.  Elias and seizure detection algorithms were utilized.    History:  -Acute stroke ;concern for seizures     Medication	  No AEDs	    Study Interpretation:    FINDINGS:  The background was continuous, spontaneously variable and reactive. During wakefulness, the posteriorly dominant rhythm consisted of symmetric, 9 Hz activity, with amplitude to 30 uV, that attenuated to eye opening. Low amplitude frontal beta was noted in wakefulness.    Background Slowing:  Diffuse polymorphic delta and theta activities     Focal Slowing:   Intermittent polymorphic delta and theta activities over the left temporal region     Sleep Background:  Drowsiness was characterized by fragmentation, attenuation, and slowing of the background activity.    Stage II sleep transients were not recorded.    Other Paroxysmal Non-Epileptiform Findings:  None were present.    Interictal Epileptiform Activity:   None were present.    Events:  Clinical events: None recorded.  Seizures: None recorded.    Activation Procedures:   Hyperventilation was not performed.    Photic stimulation was performed and did not elicit any abnormalities.      Artifacts:  Intermittent myogenic and movement artifacts were noted.    ECG:  The heart rate on single channel ECG was predominantly between 60-70BPM.    EEG Summary / Classification:  Abnormal EEG in the awake and drowsy states.  -Mild background slowing   -Intermittent polymorphic delta and theta activities over the left temporal region   EEG Impression / Clinical Correlate:  Abnormal EEG in the awake and drowsy states.  1. Mild diffuse or multifocal cerebral dysfunction   2. Functional abnormality in the left temporal region   3. There were no epileptiform abnormalities recorded.      Preliminary Fellow Read:  Huong Bartholomew MD  ________________________________________  Neurophysiology/Epilepsy Fellow, [facility]  Report of Routine EEG     [facility]: [address], [address] [place] [address], Phone: [phone]  [facility]: [address] [place] [address], Phone: [phone]  Office: 1 [facility], [address], [place] [address], Phone: [phone]    Patient Name: SINCERE SULTANA    Age: 89 y  : 1926  Referring Physician: DR FREEMAN    EEG #:   Study Date: 3/29/2018		    Technical Information:					  Placement and Labeling of Electrodes:  The EEG was performed utilizing 20 channels referential EEG connections (coronal over temporal over parasagittal montage) using all standard 10-20 electrode placements with EKG.  Recording was at a sampling rate of 256 samples per second per channel.  Time synchronized digital video recording was done simultaneously with EEG recording.  A low light infrared camera was used for low light recording.  Elias and seizure detection algorithms were utilized.    History:  -Acute stroke ;concern for seizures     Medication	  No AEDs	    Study Interpretation:    FINDINGS:  The background was continuous, spontaneously variable and reactive. During wakefulness, the posteriorly dominant rhythm consisted of symmetric, 9 Hz activity, with amplitude to 30 uV, that attenuated to eye opening. Low amplitude frontal beta was noted in wakefulness.    Background Slowing:  Diffuse polymorphic delta and theta activities     Focal Slowing:   Intermittent polymorphic delta and theta activities over the left temporal region     Sleep Background:  Drowsiness was characterized by fragmentation, attenuation, and slowing of the background activity.    Stage II sleep transients were not recorded.    Other Paroxysmal Non-Epileptiform Findings:  None were present.    Interictal Epileptiform Activity:   None were present.    Events:  Clinical events: None recorded.  Seizures: None recorded.    Activation Procedures:   Hyperventilation was not performed.    Photic stimulation was performed and did not elicit any abnormalities.      Artifacts:  Intermittent myogenic and movement artifacts were noted.    ECG:  The heart rate on single channel ECG was predominantly between 60-70BPM.    EEG Summary / Classification:  Abnormal EEG in the awake and drowsy states.  -Mild background slowing   -Intermittent polymorphic delta and theta activities over the left temporal region   EEG Impression / Clinical Correlate:  Abnormal EEG in the awake and drowsy states.  1. Mild diffuse or multifocal cerebral dysfunction   2. Functional abnormality in the left temporal region   3. There were no epileptiform abnormalities recorded.      Huong Bartholomew MD  ________________________________________  Neurophysiology/Epilepsy Fellow, [facility] [place]

## 2018-03-29 NOTE — PROGRESS NOTE ADULT - PROBLEM SELECTOR PLAN 2
Likely 2/2 to CVA  ***Pending EEG to r/o seizure Likely 2/2 to CVA  ***EEG negative for epileptiform activity

## 2018-03-30 LAB
ANION GAP SERPL CALC-SCNC: 5 MMOL/L — SIGNIFICANT CHANGE UP (ref 5–17)
BASOPHILS # BLD AUTO: 0.1 K/UL — SIGNIFICANT CHANGE UP (ref 0–0.2)
BASOPHILS NFR BLD AUTO: 1.6 % — SIGNIFICANT CHANGE UP (ref 0–2)
BUN SERPL-MCNC: 15 MG/DL — SIGNIFICANT CHANGE UP (ref 7–18)
CALCIUM SERPL-MCNC: 8.7 MG/DL — SIGNIFICANT CHANGE UP (ref 8.4–10.5)
CHLORIDE SERPL-SCNC: 109 MMOL/L — HIGH (ref 96–108)
CO2 SERPL-SCNC: 27 MMOL/L — SIGNIFICANT CHANGE UP (ref 22–31)
CREAT SERPL-MCNC: 0.55 MG/DL — SIGNIFICANT CHANGE UP (ref 0.5–1.3)
EOSINOPHIL # BLD AUTO: 0 K/UL — SIGNIFICANT CHANGE UP (ref 0–0.5)
EOSINOPHIL NFR BLD AUTO: 0.9 % — SIGNIFICANT CHANGE UP (ref 0–6)
GLUCOSE BLDC GLUCOMTR-MCNC: 146 MG/DL — HIGH (ref 70–99)
GLUCOSE BLDC GLUCOMTR-MCNC: 147 MG/DL — HIGH (ref 70–99)
GLUCOSE BLDC GLUCOMTR-MCNC: 157 MG/DL — HIGH (ref 70–99)
GLUCOSE BLDC GLUCOMTR-MCNC: 168 MG/DL — HIGH (ref 70–99)
GLUCOSE BLDC GLUCOMTR-MCNC: 228 MG/DL — HIGH (ref 70–99)
GLUCOSE BLDC GLUCOMTR-MCNC: 235 MG/DL — HIGH (ref 70–99)
GLUCOSE BLDC GLUCOMTR-MCNC: 249 MG/DL — HIGH (ref 70–99)
GLUCOSE SERPL-MCNC: 141 MG/DL — HIGH (ref 70–99)
HCT VFR BLD CALC: 31.8 % — LOW (ref 34.5–45)
HGB BLD-MCNC: 9.7 G/DL — LOW (ref 11.5–15.5)
LYMPHOCYTES # BLD AUTO: 1.5 K/UL — SIGNIFICANT CHANGE UP (ref 1–3.3)
LYMPHOCYTES # BLD AUTO: 27.3 % — SIGNIFICANT CHANGE UP (ref 13–44)
MCHC RBC-ENTMCNC: 29.3 PG — SIGNIFICANT CHANGE UP (ref 27–34)
MCHC RBC-ENTMCNC: 30.5 GM/DL — LOW (ref 32–36)
MCV RBC AUTO: 96.2 FL — SIGNIFICANT CHANGE UP (ref 80–100)
MONOCYTES # BLD AUTO: 0.7 K/UL — SIGNIFICANT CHANGE UP (ref 0–0.9)
MONOCYTES NFR BLD AUTO: 12.7 % — SIGNIFICANT CHANGE UP (ref 2–14)
NEUTROPHILS # BLD AUTO: 3.1 K/UL — SIGNIFICANT CHANGE UP (ref 1.8–7.4)
NEUTROPHILS NFR BLD AUTO: 57.6 % — SIGNIFICANT CHANGE UP (ref 43–77)
PLATELET # BLD AUTO: 226 K/UL — SIGNIFICANT CHANGE UP (ref 150–400)
POTASSIUM SERPL-MCNC: 3.9 MMOL/L — SIGNIFICANT CHANGE UP (ref 3.5–5.3)
POTASSIUM SERPL-SCNC: 3.9 MMOL/L — SIGNIFICANT CHANGE UP (ref 3.5–5.3)
RBC # BLD: 3.3 M/UL — LOW (ref 3.8–5.2)
RBC # FLD: 14 % — SIGNIFICANT CHANGE UP (ref 10.3–14.5)
SODIUM SERPL-SCNC: 141 MMOL/L — SIGNIFICANT CHANGE UP (ref 135–145)
WBC # BLD: 5.4 K/UL — SIGNIFICANT CHANGE UP (ref 3.8–10.5)
WBC # FLD AUTO: 5.4 K/UL — SIGNIFICANT CHANGE UP (ref 3.8–10.5)

## 2018-03-30 PROCEDURE — 99233 SBSQ HOSP IP/OBS HIGH 50: CPT | Mod: GC

## 2018-03-30 RX ORDER — CARVEDILOL PHOSPHATE 80 MG/1
6.25 CAPSULE, EXTENDED RELEASE ORAL ONCE
Qty: 0 | Refills: 0 | Status: COMPLETED | OUTPATIENT
Start: 2018-03-30 | End: 2018-03-30

## 2018-03-30 RX ORDER — LOSARTAN POTASSIUM 100 MG/1
50 TABLET, FILM COATED ORAL DAILY
Qty: 0 | Refills: 0 | Status: DISCONTINUED | OUTPATIENT
Start: 2018-03-30 | End: 2018-03-31

## 2018-03-30 RX ORDER — CARVEDILOL PHOSPHATE 80 MG/1
12.5 CAPSULE, EXTENDED RELEASE ORAL EVERY 12 HOURS
Qty: 0 | Refills: 0 | Status: DISCONTINUED | OUTPATIENT
Start: 2018-03-30 | End: 2018-04-01

## 2018-03-30 RX ADMIN — CARVEDILOL PHOSPHATE 6.25 MILLIGRAM(S): 80 CAPSULE, EXTENDED RELEASE ORAL at 11:14

## 2018-03-30 RX ADMIN — CARVEDILOL PHOSPHATE 3.12 MILLIGRAM(S): 80 CAPSULE, EXTENDED RELEASE ORAL at 06:25

## 2018-03-30 RX ADMIN — ENOXAPARIN SODIUM 40 MILLIGRAM(S): 100 INJECTION SUBCUTANEOUS at 11:14

## 2018-03-30 RX ADMIN — Medication 2: at 18:07

## 2018-03-30 RX ADMIN — Medication 1: at 00:22

## 2018-03-30 RX ADMIN — CARVEDILOL PHOSPHATE 12.5 MILLIGRAM(S): 80 CAPSULE, EXTENDED RELEASE ORAL at 17:03

## 2018-03-30 RX ADMIN — Medication 81 MILLIGRAM(S): at 11:13

## 2018-03-30 RX ADMIN — Medication 2: at 12:08

## 2018-03-30 RX ADMIN — ATORVASTATIN CALCIUM 80 MILLIGRAM(S): 80 TABLET, FILM COATED ORAL at 22:02

## 2018-03-30 RX ADMIN — LOSARTAN POTASSIUM 50 MILLIGRAM(S): 100 TABLET, FILM COATED ORAL at 11:13

## 2018-03-30 NOTE — PROGRESS NOTE ADULT - PROBLEM SELECTOR PLAN 1
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 David, NSG Dr. Burciaga recommends outpatient f/u with endovascular for CTA  - C/w ASA, Statin, and Lovenox; telemetry for r/o Afib  - C/w LD of Coreg (prevent rebound tachycardia) for Permissive HTN x 48 total hours; may titrate in AM as BP tolerates  - PT recommend JB; patient agrees  - Swallow evaluation noted; Pureed + Thin  ***Pending TTE and cardiac f/u

## 2018-03-30 NOTE — PROGRESS NOTE ADULT - SUBJECTIVE AND OBJECTIVE BOX
PGY 1 Note discussed with supervising resident and primary attending    Patient is a 89y old  Female who presents with a chief complaint of Fall (29 Mar 2018 18:28)      INTERVAL HPI/OVERNIGHT EVENTS: Patient seen and examined at bedside with no new complaints    MEDICATIONS  (STANDING):  aspirin  chewable 81 milliGRAM(s) Oral daily  atorvastatin 80 milliGRAM(s) Oral at bedtime  carvedilol 3.125 milliGRAM(s) Oral every 12 hours  enoxaparin Injectable 40 milliGRAM(s) SubCutaneous daily  insulin lispro (HumaLOG) corrective regimen sliding scale   SubCutaneous every 6 hours  sodium chloride 0.9% with potassium chloride 20 mEq/L 1000 milliLiter(s) (55 mL/Hr) IV Continuous <Continuous>    MEDICATIONS  (PRN):      __________________________________________________  REVIEW OF SYSTEMS:    CONSTITUTIONAL: No fever,   EYES: No acute visual disturbances  NECK: No pain or stiffness  RESPIRATORY: No cough; No shortness of breath  CARDIOVASCULAR: No chest pain, no palpitations  GASTROINTESTINAL: No pain. No nausea or vomiting; No diarrhea   NEUROLOGICAL: No headache or numbness, no tremors  MUSCULOSKELETAL: No joint pain, no muscle pain  GENITOURINARY: No dysuria, no frequency, no hesitancy  PSYCHIATRY: No depression , no anxiety  ALL OTHER  ROS negative        Vital Signs Last 24 Hrs  T(C): 36.6 (30 Mar 2018 05:52), Max: 36.9 (29 Mar 2018 22:08)  T(F): 97.8 (30 Mar 2018 05:52), Max: 98.5 (29 Mar 2018 22:08)  HR: 71 (30 Mar 2018 05:52) (64 - 82)  BP: 218/77 (30 Mar 2018 05:52) (136/56 - 218/77)  BP(mean): --  RR: 18 (30 Mar 2018 05:52) (17 - 169)  SpO2: 100% (30 Mar 2018 05:52) (98% - 100%)    ________________________________________________  PHYSICAL EXAM:  GENERAL: NAD  HEENT: Normocephalic;  conjunctivae and sclerae clear; moist mucous membranes; R facial droop  NECK : supple  CHEST/LUNG: Clear to auscultation bilaterally with good air entry   HEART: S1 S2  regular; no murmurs, gallops or rubs  ABDOMEN: Soft, Nontender, Nondistended; Bowel sounds present  EXTREMITIES: No cyanosis; no edema; no calf tenderness  NERVOUS SYSTEM:  Awake and alert; Oriented  to place, person and time ; no new deficits, 3/5 strength L extremities    _________________________________________________  LABS:                        9.7    5.4   )-----------( 226      ( 30 Mar 2018 06:50 )             31.8     03-30    141  |  109<H>  |  15  ----------------------------<  141<H>  3.9   |  27  |  0.55    Ca    8.7      30 Mar 2018 06:50    TPro  6.2  /  Alb  2.7<L>  /  TBili  0.3  /  DBili  x   /  AST  35  /  ALT  30  /  AlkPhos  61  03-29        CAPILLARY BLOOD GLUCOSE      POCT Blood Glucose.: 147 mg/dL (30 Mar 2018 07:44)  POCT Blood Glucose.: 146 mg/dL (30 Mar 2018 06:32)  POCT Blood Glucose.: 157 mg/dL (30 Mar 2018 00:11)  POCT Blood Glucose.: 248 mg/dL (29 Mar 2018 22:14)  POCT Blood Glucose.: 157 mg/dL (29 Mar 2018 16:31)  POCT Blood Glucose.: 303 mg/dL (29 Mar 2018 12:02)        RADIOLOGY & ADDITIONAL TESTS:    Imaging Personally Reviewed:  YES    Consultant(s) Notes Reviewed:   YES    Care Discussed with Consultants : YES    Plan of care was discussed with patient and /or primary care giver; all questions and concerns were addressed and care was aligned with patient's wishes.

## 2018-03-30 NOTE — PROGRESS NOTE ADULT - PROBLEM SELECTOR PLAN 5
Plan as above for permissive HTN x 48 hours  - BP acceptable 130/50 - 189/76  ***Home medications included Coreg and Losartan; updated medication reconciliation Plan as above for permissive HTN x 48 hours  - BP elevated 200s  ***Restarted home medications included Coreg and Losartan

## 2018-03-30 NOTE — PROGRESS NOTE ADULT - ATTENDING COMMENTS
Patient seen/evaluated at bedside 3/30/2018. I agree with the resident progress note/outlined plan of care. My independent findings and conclusions are documented. Patient seen/evaluated at bedside 3/30/2018. I agree with the resident progress note/outlined plan of care. My independent findings and conclusions are documented.    Pt w/out complaints other than wanting to be seated higher. No HA, visual changes, nausea/vomiting.  Of note, patient never had a syncopal episode. She fell and was unable to get up    PE   reveals stable left sided hemiplegia      TTE: 1. Mitral annular calcification. Mild mitral regurgitation.    2. Calcified trileaflet aortic valve with normal opening.  3. Aortic Root: 2.9 cm.  4. Mild left atrial enlargement.  5. Normal left ventricular internal dimensions and wall  thicknesses.  6. Normal Left Ventricular Systolic Function,  (EF = 55 to  60%)  7. Grade I diastolic dysfunction (Impaired relaxation).  8. Normal right atrium.  9. Normal right ventricular size and function.  10. RA Pressure is 8 mm Hg.  11. RV systolic pressure is 35 mm Hg.  12. There is mild tricuspid regurgitation.  13. Normal pericardium with no pericardial effusion.    1. 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 Left ICA stenosis (moderate to severe)  3. hypertensive urgency  4. bell's palsy  5. functional quadriplegia    patient now out of window for permissive hypertension. Per review she was given losartan 50mg this am--> f/u effect  -c/w coreg  -titrate ARB upward w/ goal of 20% daily reduction of BP (unless sx present) to normalized BP  -q2 hour positioning changes  -q routine neuro checks Patient seen/evaluated at bedside 3/30/2018. I agree with the resident progress note/outlined plan of care. My independent findings and conclusions are documented.    Pt w/out complaints other than wanting to be seated higher. No HA, visual changes, nausea/vomiting.  Of note, patient never had a syncopal episode. She fell and was unable to get up    PE   reveals stable left sided hemiplegia      TTE: 1. Mitral annular calcification. Mild mitral regurgitation.    2. Calcified trileaflet aortic valve with normal opening.  3. Aortic Root: 2.9 cm.  4. Mild left atrial enlargement.  5. Normal left ventricular internal dimensions and wall  thicknesses.  6. Normal Left Ventricular Systolic Function,  (EF = 55 to  60%)  7. Grade I diastolic dysfunction (Impaired relaxation).  8. Normal right atrium.  9. Normal right ventricular size and function.  10. RA Pressure is 8 mm Hg.  11. RV systolic pressure is 35 mm Hg.  12. There is mild tricuspid regurgitation.  13. Normal pericardium with no pericardial effusion.    1. 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 Left ICA stenosis (moderate to severe)  3. hypertensive urgency  4. bell's palsy  5. functional quadriplegia    patient now out of window for permissive hypertension. Per review she was given losartan 50mg this am--> f/u effect  -c/w coreg  -titrate ARB upward w/ goal of 20% daily reduction of BP (unless sx present) to normalized BP  -q2 hour positioning changes  -q routine neuro checks  -for JB, list submitted to patient and HCP

## 2018-03-30 NOTE — PROGRESS NOTE ADULT - PROBLEM SELECTOR PLAN 7
No immediate family to make decisions in the future  - HCP form completed. No immediate family to make decisions in the future  - HCP form completed.  ***Patient undecided about code status; Jeferson agrees w/ DNR/DNI

## 2018-03-31 LAB
ANION GAP SERPL CALC-SCNC: 4 MMOL/L — LOW (ref 5–17)
BASOPHILS # BLD AUTO: 0.1 K/UL — SIGNIFICANT CHANGE UP (ref 0–0.2)
BASOPHILS NFR BLD AUTO: 1.4 % — SIGNIFICANT CHANGE UP (ref 0–2)
BUN SERPL-MCNC: 17 MG/DL — SIGNIFICANT CHANGE UP (ref 7–18)
CALCIUM SERPL-MCNC: 8.5 MG/DL — SIGNIFICANT CHANGE UP (ref 8.4–10.5)
CHLORIDE SERPL-SCNC: 108 MMOL/L — SIGNIFICANT CHANGE UP (ref 96–108)
CO2 SERPL-SCNC: 29 MMOL/L — SIGNIFICANT CHANGE UP (ref 22–31)
CREAT SERPL-MCNC: 0.56 MG/DL — SIGNIFICANT CHANGE UP (ref 0.5–1.3)
EOSINOPHIL # BLD AUTO: 0.1 K/UL — SIGNIFICANT CHANGE UP (ref 0–0.5)
EOSINOPHIL NFR BLD AUTO: 1.6 % — SIGNIFICANT CHANGE UP (ref 0–6)
GLUCOSE BLDC GLUCOMTR-MCNC: 150 MG/DL — HIGH (ref 70–99)
GLUCOSE BLDC GLUCOMTR-MCNC: 153 MG/DL — HIGH (ref 70–99)
GLUCOSE BLDC GLUCOMTR-MCNC: 159 MG/DL — HIGH (ref 70–99)
GLUCOSE BLDC GLUCOMTR-MCNC: 180 MG/DL — HIGH (ref 70–99)
GLUCOSE BLDC GLUCOMTR-MCNC: 193 MG/DL — HIGH (ref 70–99)
GLUCOSE BLDC GLUCOMTR-MCNC: 305 MG/DL — HIGH (ref 70–99)
GLUCOSE SERPL-MCNC: 149 MG/DL — HIGH (ref 70–99)
HCT VFR BLD CALC: 32.1 % — LOW (ref 34.5–45)
HGB BLD-MCNC: 10 G/DL — LOW (ref 11.5–15.5)
LYMPHOCYTES # BLD AUTO: 1.3 K/UL — SIGNIFICANT CHANGE UP (ref 1–3.3)
LYMPHOCYTES # BLD AUTO: 27.4 % — SIGNIFICANT CHANGE UP (ref 13–44)
MCHC RBC-ENTMCNC: 29.9 PG — SIGNIFICANT CHANGE UP (ref 27–34)
MCHC RBC-ENTMCNC: 31.2 GM/DL — LOW (ref 32–36)
MCV RBC AUTO: 95.7 FL — SIGNIFICANT CHANGE UP (ref 80–100)
MONOCYTES # BLD AUTO: 0.5 K/UL — SIGNIFICANT CHANGE UP (ref 0–0.9)
MONOCYTES NFR BLD AUTO: 9.9 % — SIGNIFICANT CHANGE UP (ref 2–14)
NEUTROPHILS # BLD AUTO: 2.8 K/UL — SIGNIFICANT CHANGE UP (ref 1.8–7.4)
NEUTROPHILS NFR BLD AUTO: 59.6 % — SIGNIFICANT CHANGE UP (ref 43–77)
PLATELET # BLD AUTO: 237 K/UL — SIGNIFICANT CHANGE UP (ref 150–400)
POTASSIUM SERPL-MCNC: 4.1 MMOL/L — SIGNIFICANT CHANGE UP (ref 3.5–5.3)
POTASSIUM SERPL-SCNC: 4.1 MMOL/L — SIGNIFICANT CHANGE UP (ref 3.5–5.3)
RBC # BLD: 3.35 M/UL — LOW (ref 3.8–5.2)
RBC # FLD: 13.8 % — SIGNIFICANT CHANGE UP (ref 10.3–14.5)
SODIUM SERPL-SCNC: 141 MMOL/L — SIGNIFICANT CHANGE UP (ref 135–145)
WBC # BLD: 4.6 K/UL — SIGNIFICANT CHANGE UP (ref 3.8–10.5)
WBC # FLD AUTO: 4.6 K/UL — SIGNIFICANT CHANGE UP (ref 3.8–10.5)

## 2018-03-31 RX ORDER — METOPROLOL TARTRATE 50 MG
2.5 TABLET ORAL EVERY 6 HOURS
Qty: 0 | Refills: 0 | Status: DISCONTINUED | OUTPATIENT
Start: 2018-03-31 | End: 2018-04-02

## 2018-03-31 RX ORDER — LOSARTAN POTASSIUM 100 MG/1
25 TABLET, FILM COATED ORAL ONCE
Qty: 0 | Refills: 0 | Status: COMPLETED | OUTPATIENT
Start: 2018-03-31 | End: 2018-03-31

## 2018-03-31 RX ORDER — LOSARTAN POTASSIUM 100 MG/1
75 TABLET, FILM COATED ORAL DAILY
Qty: 0 | Refills: 0 | Status: DISCONTINUED | OUTPATIENT
Start: 2018-03-31 | End: 2018-04-02

## 2018-03-31 RX ADMIN — CARVEDILOL PHOSPHATE 12.5 MILLIGRAM(S): 80 CAPSULE, EXTENDED RELEASE ORAL at 18:01

## 2018-03-31 RX ADMIN — Medication 4: at 12:43

## 2018-03-31 RX ADMIN — LOSARTAN POTASSIUM 50 MILLIGRAM(S): 100 TABLET, FILM COATED ORAL at 06:10

## 2018-03-31 RX ADMIN — CARVEDILOL PHOSPHATE 12.5 MILLIGRAM(S): 80 CAPSULE, EXTENDED RELEASE ORAL at 06:10

## 2018-03-31 RX ADMIN — LOSARTAN POTASSIUM 25 MILLIGRAM(S): 100 TABLET, FILM COATED ORAL at 12:44

## 2018-03-31 RX ADMIN — Medication 81 MILLIGRAM(S): at 12:44

## 2018-03-31 RX ADMIN — ENOXAPARIN SODIUM 40 MILLIGRAM(S): 100 INJECTION SUBCUTANEOUS at 12:44

## 2018-03-31 RX ADMIN — Medication 1: at 18:01

## 2018-03-31 RX ADMIN — ATORVASTATIN CALCIUM 80 MILLIGRAM(S): 80 TABLET, FILM COATED ORAL at 22:22

## 2018-03-31 RX ADMIN — Medication 1: at 06:58

## 2018-03-31 NOTE — PROGRESS NOTE ADULT - ATTENDING COMMENTS
Patient seen/evaluated at bedside 3/31/2018.    Pt still asking  to be seated higher. No HA, visual changes, nausea/vomiting.  Of note, patient never had a syncopal episode. She fell and was unable to get up    PE   reveals stable left sided hemiplegia      TTE: 1. Mitral annular calcification. Mild mitral regurgitation.    2. Calcified trileaflet aortic valve with normal opening.  3. Aortic Root: 2.9 cm.  4. Mild left atrial enlargement.  5. Normal left ventricular internal dimensions and wall  thicknesses.  6. Normal Left Ventricular Systolic Function,  (EF = 55 to  60%)  7. Grade I diastolic dysfunction (Impaired relaxation).  8. Normal right atrium.  9. Normal right ventricular size and function.  10. RA Pressure is 8 mm Hg.  11. RV systolic pressure is 35 mm Hg.  12. There is mild tricuspid regurgitation.  13. Normal pericardium with no pericardial effusion.    1. 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 Left ICA stenosis (moderate to severe)  3. hypertensive urgency  4. bell's palsy  5. functional quadriplegia    patient now out of window for permissive hypertension. Per review she was given losartan 50mg this am--> f/u effect  -c/w coreg  -titrate ARB upward w/ goal of 20% daily reduction of BP (unless sx present) to normalized BP  -q2 hour positioning changes  -q routine neuro checks  -for JB, list submitted to patient and HCP

## 2018-03-31 NOTE — PROGRESS NOTE ADULT - PROBLEM SELECTOR PLAN 7
No immediate family to make decisions in the future  - HCP form completed.  ***Patient undecided about code status; Jeferson agrees w/ DNR/DNI

## 2018-03-31 NOTE — PROGRESS NOTE ADULT - SUBJECTIVE AND OBJECTIVE BOX
MEDICAL ATTENDING NOTE  Patient is a 89y old  Female who presents with a chief complaint of Fall (29 Mar 2018 18:28)      HPI:  89 yo F , lives alone at home , AAO*3 , walks with help of a walker , PMH of DM , HTN , [place] palsy in past on right side of face,  was brought in by neighbour after an unwitnessed fall. Patient was last seen yesterday by neighbour , doing her daily chores. As per the patient , patient had a forward fall this morning and since then she could not get up until she was found on the floor by her Physical therapist and neighbour in afternoon , who decided to bring her here. As per neighbour , patient has had recent onset of forgetfulness ( last 2-3 months ).   Patient remembers the whole event , denies any loss of consciousness , did not hit her head. Denies any joint pain , nausea , vomiting ,diarrhea , recent sick contacts , fever , or any recent hospitalization.    In ED , BP : 190/84 mmhg , HR : 76 Temp : 97.4 F  Ct head was negative. Ct Spine reveled spine degenerative changes on multiple levels.   Troponin *1 mild elevated ( 0.07) , EKG : NSR ; Ua negative   CBC : Wbc count 10.8 with Left shift   CXR : cardiomegaly , dilation of aortic root with possible infiltrate on right side of lung (27 Mar 2018 18:23)      INTERVAL HPI/OVERNIGHT EVENTS: no new complaints    MEDICATIONS  (STANDING):  aspirin  chewable 81 milliGRAM(s) Oral daily  atorvastatin 80 milliGRAM(s) Oral at bedtime  carvedilol 12.5 milliGRAM(s) Oral every 12 hours  enoxaparin Injectable 40 milliGRAM(s) SubCutaneous daily  insulin lispro (HumaLOG) corrective regimen sliding scale   SubCutaneous every 6 hours  losartan 75 milliGRAM(s) Oral daily  sodium chloride 0.9% with potassium chloride 20 mEq/L 1000 milliLiter(s) (55 mL/Hr) IV Continuous <Continuous>    MEDICATIONS  (PRN):  metoprolol tartrate Injectable 2.5 milliGRAM(s) IV Push every 6 hours PRN sbp>180, hold for HR<65      __________________________________________________  REVIEW OF SYSTEMS:    CONSTITUTIONAL: No fever,   EYES: no acute visual disturbances  NECK: No pain or stiffness  RESPIRATORY: No cough; No shortness of breath  CARDIOVASCULAR: No chest pain, no palpitations  GASTROINTESTINAL: No pain. No nausea or vomiting; No diarrhea   NEUROLOGICAL: No headache or numbness, no tremors  MUSCULOSKELETAL: No joint pain, no muscle pain  GENITOURINARY: no dysuria, no frequency, no hesitancy  PSYCHIATRY: no depression , no anxiety  ALL OTHER  ROS negative        Vital Signs Last 24 Hrs  T(C): 36.1 (31 Mar 2018 14:25), Max: 36.6 (31 Mar 2018 06:18)  T(F): 97 (31 Mar 2018 14:25), Max: 97.9 (31 Mar 2018 06:18)  HR: 65 (31 Mar 2018 18:01) (62 - 76)  BP: 180/86 (31 Mar 2018 18:01) (171/56 - 196/67)  BP(mean): --  RR: 15 (31 Mar 2018 14:25) (15 - 17)  SpO2: 98% (31 Mar 2018 14:25) (98% - 100%)    ________________________________________________  PHYSICAL EXAM:  GENERAL: NAD  HEENT: Normocephalic;  conjunctivae and sclerae clear; moist mucous membranes;   NECK : supple  CHEST/LUNG: Clear to auscultation bilaterally with good air entry   HEART: S1 S2  regular; no murmurs, gallops or rubs  ABDOMEN: Soft, Nontender, Nondistended; Bowel sounds present  EXTREMITIES: no cyanosis; no edema; no calf tenderness  SKIN: warm and dry; no rash  NERVOUS SYSTEM:  Awake and alert; Oriented  to place, person and time ; no new deficits    _________________________________________________  LABS:                        10.0   4.6   )-----------( 237      ( 31 Mar 2018 07:40 )             32.1     03-31    141  |  108  |  17  ----------------------------<  149<H>  4.1   |  29  |  0.56    Ca    8.5      31 Mar 2018 07:40          CAPILLARY BLOOD GLUCOSE      POCT Blood Glucose.: 180 mg/dL (31 Mar 2018 18:51)  POCT Blood Glucose.: 193 mg/dL (31 Mar 2018 17:54)  POCT Blood Glucose.: 305 mg/dL (31 Mar 2018 11:49)  POCT Blood Glucose.: 159 mg/dL (31 Mar 2018 07:29)  POCT Blood Glucose.: 153 mg/dL (31 Mar 2018 06:30)  POCT Blood Glucose.: 150 mg/dL (31 Mar 2018 00:09)  POCT Blood Glucose.: 168 mg/dL (30 Mar 2018 21:25) MEDICAL ATTENDING NOTE  Patient is a 89y old  Female who presents with a chief complaint of Fall (29 Mar 2018 18:28)      HPI:  89 yo F , lives alone at home , AAO*3 , walks with help of a walker , PMH of DM , HTN , [place] palsy in past on right side of face,  was brought in by neighbour after an unwitnessed fall. Patient was last seen yesterday by neighbour , doing her daily chores. As per the patient , patient had a forward fall this morning and since then she could not get up until she was found on the floor by her Physical therapist and neighbour in afternoon , who decided to bring her here. As per neighbour , patient has had recent onset of forgetfulness ( last 2-3 months ).   Patient remembers the whole event , denies any loss of consciousness , did not hit her head. Denies any joint pain , nausea , vomiting ,diarrhea , recent sick contacts , fever , or any recent hospitalization.    In ED , BP : 190/84 mmhg , HR : 76 Temp : 97.4 F  Ct head was negative. Ct Spine reveled spine degenerative changes on multiple levels.   Troponin *1 mild elevated ( 0.07) , EKG : NSR ; Ua negative   CBC : Wbc count 10.8 with Left shift   CXR : cardiomegaly , dilation of aortic root with possible infiltrate on right side of lung (27 Mar 2018 18:23)      INTERVAL HPI/OVERNIGHT EVENTS: no new complaints    MEDICATIONS  (STANDING):  aspirin  chewable 81 milliGRAM(s) Oral daily  atorvastatin 80 milliGRAM(s) Oral at bedtime  carvedilol 12.5 milliGRAM(s) Oral every 12 hours  enoxaparin Injectable 40 milliGRAM(s) SubCutaneous daily  insulin lispro (HumaLOG) corrective regimen sliding scale   SubCutaneous every 6 hours  losartan 75 milliGRAM(s) Oral daily  sodium chloride 0.9% with potassium chloride 20 mEq/L 1000 milliLiter(s) (55 mL/Hr) IV Continuous <Continuous>    MEDICATIONS  (PRN):  metoprolol tartrate Injectable 2.5 milliGRAM(s) IV Push every 6 hours PRN sbp>180, hold for HR<65      __________________________________________________  REVIEW OF SYSTEMS:    CONSTITUTIONAL: No fever,   EYES: no acute visual disturbances  NECK: No pain or stiffness  RESPIRATORY: No cough; No shortness of breath  CARDIOVASCULAR: No chest pain, no palpitations  GASTROINTESTINAL: No pain. No nausea or vomiting; No diarrhea   NEUROLOGICAL: No headache or numbness, no tremors  MUSCULOSKELETAL: No joint pain, no muscle pain  GENITOURINARY: no dysuria, no frequency, no hesitancy  PSYCHIATRY: no depression , no anxiety  ALL OTHER  ROS negative        Vital Signs Last 24 Hrs  T(C): 36.1 (31 Mar 2018 14:25), Max: 36.6 (31 Mar 2018 06:18)  T(F): 97 (31 Mar 2018 14:25), Max: 97.9 (31 Mar 2018 06:18)  HR: 65 (31 Mar 2018 18:01) (62 - 76)  BP: 180/86 (31 Mar 2018 18:01) (171/56 - 196/67)  BP(mean): --  RR: 15 (31 Mar 2018 14:25) (15 - 17)  SpO2: 98% (31 Mar 2018 14:25) (98% - 100%)    ________________________________________________  PHYSICAL EXAM:  GENERAL: NAD  HEENT: Normocephalic; Bell's palsy, right moist mucous membranes;   NECK : supple  CHEST/LUNG: Clear to auscultation bilaterally with good air entry   HEART: S1 S2  regular; no murmurs, gallops or rubs  ABDOMEN: Soft, Nontender, Nondistended; Bowel sounds present  EXTREMITIES: no cyanosis; no edema; no calf tenderness  SKIN: warm and dry; no rash  NERVOUS SYSTEM:  Awake and alert; Oriented  to place, person and time, left hemiplegia    _________________________________________________  LABS:                        10.0   4.6   )-----------( 237      ( 31 Mar 2018 07:40 )             32.1     03-31    141  |  108  |  17  ----------------------------<  149<H>  4.1   |  29  |  0.56    Ca    8.5      31 Mar 2018 07:40          CAPILLARY BLOOD GLUCOSE      POCT Blood Glucose.: 180 mg/dL (31 Mar 2018 18:51)  POCT Blood Glucose.: 193 mg/dL (31 Mar 2018 17:54)  POCT Blood Glucose.: 305 mg/dL (31 Mar 2018 11:49)  POCT Blood Glucose.: 159 mg/dL (31 Mar 2018 07:29)  POCT Blood Glucose.: 153 mg/dL (31 Mar 2018 06:30)  POCT Blood Glucose.: 150 mg/dL (31 Mar 2018 00:09)  POCT Blood Glucose.: 168 mg/dL (30 Mar 2018 21:25)

## 2018-04-01 LAB
ANION GAP SERPL CALC-SCNC: 5 MMOL/L — SIGNIFICANT CHANGE UP (ref 5–17)
BASOPHILS # BLD AUTO: 0.1 K/UL — SIGNIFICANT CHANGE UP (ref 0–0.2)
BASOPHILS NFR BLD AUTO: 1.4 % — SIGNIFICANT CHANGE UP (ref 0–2)
BUN SERPL-MCNC: 22 MG/DL — HIGH (ref 7–18)
CALCIUM SERPL-MCNC: 8.7 MG/DL — SIGNIFICANT CHANGE UP (ref 8.4–10.5)
CHLORIDE SERPL-SCNC: 105 MMOL/L — SIGNIFICANT CHANGE UP (ref 96–108)
CO2 SERPL-SCNC: 30 MMOL/L — SIGNIFICANT CHANGE UP (ref 22–31)
CREAT SERPL-MCNC: 0.63 MG/DL — SIGNIFICANT CHANGE UP (ref 0.5–1.3)
EOSINOPHIL # BLD AUTO: 0.1 K/UL — SIGNIFICANT CHANGE UP (ref 0–0.5)
EOSINOPHIL NFR BLD AUTO: 1.2 % — SIGNIFICANT CHANGE UP (ref 0–6)
GLUCOSE BLDC GLUCOMTR-MCNC: 135 MG/DL — HIGH (ref 70–99)
GLUCOSE BLDC GLUCOMTR-MCNC: 297 MG/DL — HIGH (ref 70–99)
GLUCOSE BLDC GLUCOMTR-MCNC: 335 MG/DL — HIGH (ref 70–99)
GLUCOSE BLDC GLUCOMTR-MCNC: 96 MG/DL — SIGNIFICANT CHANGE UP (ref 70–99)
GLUCOSE SERPL-MCNC: 159 MG/DL — HIGH (ref 70–99)
HCT VFR BLD CALC: 31.8 % — LOW (ref 34.5–45)
HGB BLD-MCNC: 9.9 G/DL — LOW (ref 11.5–15.5)
LYMPHOCYTES # BLD AUTO: 1.3 K/UL — SIGNIFICANT CHANGE UP (ref 1–3.3)
LYMPHOCYTES # BLD AUTO: 23.8 % — SIGNIFICANT CHANGE UP (ref 13–44)
MCHC RBC-ENTMCNC: 29.8 PG — SIGNIFICANT CHANGE UP (ref 27–34)
MCHC RBC-ENTMCNC: 31.2 GM/DL — LOW (ref 32–36)
MCV RBC AUTO: 95.5 FL — SIGNIFICANT CHANGE UP (ref 80–100)
MONOCYTES # BLD AUTO: 0.5 K/UL — SIGNIFICANT CHANGE UP (ref 0–0.9)
MONOCYTES NFR BLD AUTO: 8.5 % — SIGNIFICANT CHANGE UP (ref 2–14)
NEUTROPHILS # BLD AUTO: 3.5 K/UL — SIGNIFICANT CHANGE UP (ref 1.8–7.4)
NEUTROPHILS NFR BLD AUTO: 65 % — SIGNIFICANT CHANGE UP (ref 43–77)
PLATELET # BLD AUTO: 222 K/UL — SIGNIFICANT CHANGE UP (ref 150–400)
POTASSIUM SERPL-MCNC: 4.2 MMOL/L — SIGNIFICANT CHANGE UP (ref 3.5–5.3)
POTASSIUM SERPL-SCNC: 4.2 MMOL/L — SIGNIFICANT CHANGE UP (ref 3.5–5.3)
RBC # BLD: 3.33 M/UL — LOW (ref 3.8–5.2)
RBC # FLD: 13.7 % — SIGNIFICANT CHANGE UP (ref 10.3–14.5)
SODIUM SERPL-SCNC: 140 MMOL/L — SIGNIFICANT CHANGE UP (ref 135–145)
WBC # BLD: 5.3 K/UL — SIGNIFICANT CHANGE UP (ref 3.8–10.5)
WBC # FLD AUTO: 5.3 K/UL — SIGNIFICANT CHANGE UP (ref 3.8–10.5)

## 2018-04-01 PROCEDURE — 99232 SBSQ HOSP IP/OBS MODERATE 35: CPT

## 2018-04-01 RX ORDER — POLYETHYLENE GLYCOL 3350 17 G/17G
17 POWDER, FOR SOLUTION ORAL DAILY
Qty: 0 | Refills: 0 | Status: DISCONTINUED | OUTPATIENT
Start: 2018-04-01 | End: 2018-04-02

## 2018-04-01 RX ORDER — CARVEDILOL PHOSPHATE 80 MG/1
25 CAPSULE, EXTENDED RELEASE ORAL EVERY 12 HOURS
Qty: 0 | Refills: 0 | Status: DISCONTINUED | OUTPATIENT
Start: 2018-04-01 | End: 2018-04-02

## 2018-04-01 RX ADMIN — Medication 81 MILLIGRAM(S): at 11:43

## 2018-04-01 RX ADMIN — Medication 4: at 11:44

## 2018-04-01 RX ADMIN — LOSARTAN POTASSIUM 75 MILLIGRAM(S): 100 TABLET, FILM COATED ORAL at 06:32

## 2018-04-01 RX ADMIN — CARVEDILOL PHOSPHATE 25 MILLIGRAM(S): 80 CAPSULE, EXTENDED RELEASE ORAL at 17:13

## 2018-04-01 RX ADMIN — ATORVASTATIN CALCIUM 80 MILLIGRAM(S): 80 TABLET, FILM COATED ORAL at 22:07

## 2018-04-01 RX ADMIN — CARVEDILOL PHOSPHATE 12.5 MILLIGRAM(S): 80 CAPSULE, EXTENDED RELEASE ORAL at 06:32

## 2018-04-01 RX ADMIN — POLYETHYLENE GLYCOL 3350 17 GRAM(S): 17 POWDER, FOR SOLUTION ORAL at 17:14

## 2018-04-01 RX ADMIN — ENOXAPARIN SODIUM 40 MILLIGRAM(S): 100 INJECTION SUBCUTANEOUS at 11:43

## 2018-04-01 RX ADMIN — Medication 3: at 17:13

## 2018-04-01 NOTE — PROGRESS NOTE ADULT - PROBLEM SELECTOR PLAN 1
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 David, NSG Dr. Burciaga recommends outpatient f/u with endovascular for CTA  - C/w ASA, Statin, and Lovenox; telemetry for r/o Afib  ***S/p Permissive HTN x 48 total hours; increasing BP meds for elevated BP; increased Coreg to 25 BID and c/w Losartan 75 daily  - PT recommend JB; patient agrees  - Swallow evaluation noted; Pureed + Thin  - TTE normal EF, G1DD

## 2018-04-01 NOTE — PROGRESS NOTE ADULT - ATTENDING COMMENTS
Patient was examined at the bedside and discussed with Dr. Sin.     She is alert, appears depressed  Vital Signs Last 24 Hrs  T(C): 36.7 (01 Apr 2018 13:55), Max: 36.9 (01 Apr 2018 05:37)  T(F): 98.1 (01 Apr 2018 13:55), Max: 98.4 (01 Apr 2018 05:37)  HR: 84 (01 Apr 2018 13:55) (63 - 84)  BP: 144/72 (01 Apr 2018 13:55) (144/72 - 181/62)  BP(mean): --  RR: 17 (01 Apr 2018 13:55) (16 - 18)  SpO2: 99% (01 Apr 2018 13:55) (99% - 100%)  Dense left hemiplegia persists    IMP:  As above  Plan: JB

## 2018-04-01 NOTE — PROGRESS NOTE ADULT - PROBLEM SELECTOR PLAN 5
Plan as above for permissive HTN x 48 hours  - BP elevated 200s  ***Restarted home medications included Coreg and Losartan

## 2018-04-01 NOTE — PROGRESS NOTE ADULT - SUBJECTIVE AND OBJECTIVE BOX
PGY 1 Note discussed with supervising resident and primary attending    Patient is a 89y old  Female who presents with a chief complaint of Fall (29 Mar 2018 18:28)      INTERVAL HPI/OVERNIGHT EVENTS: Patient seen and examined at bedside with no new complaints - pending JB, patient not decided on DNR/DNI but HCP agrees w/ DNR/DNI    MEDICATIONS  (STANDING):  aspirin  chewable 81 milliGRAM(s) Oral daily  atorvastatin 80 milliGRAM(s) Oral at bedtime  carvedilol 12.5 milliGRAM(s) Oral every 12 hours  enoxaparin Injectable 40 milliGRAM(s) SubCutaneous daily  insulin lispro (HumaLOG) corrective regimen sliding scale   SubCutaneous every 6 hours  losartan 75 milliGRAM(s) Oral daily  sodium chloride 0.9% with potassium chloride 20 mEq/L 1000 milliLiter(s) (55 mL/Hr) IV Continuous <Continuous>    MEDICATIONS  (PRN):  metoprolol tartrate Injectable 2.5 milliGRAM(s) IV Push every 6 hours PRN sbp>180, hold for HR<65      __________________________________________________  REVIEW OF SYSTEMS:  RESPIRATORY: No cough; No shortness of breath  CARDIOVASCULAR: No chest pain, no palpitations  GASTROINTESTINAL: No pain. No nausea or vomiting; No diarrhea       Vital Signs Last 24 Hrs  T(C): 36.9 (01 Apr 2018 05:37), Max: 36.9 (01 Apr 2018 05:37)  T(F): 98.4 (01 Apr 2018 05:37), Max: 98.4 (01 Apr 2018 05:37)  HR: 65 (01 Apr 2018 05:37) (63 - 76)  BP: 171/73 (01 Apr 2018 05:37) (171/56 - 181/62)  BP(mean): --  RR: 18 (01 Apr 2018 05:37) (15 - 18)  SpO2: 100% (01 Apr 2018 05:37) (98% - 100%)    ________________________________________________  PHYSICAL EXAM:  GENERAL: NAD, pleasent  HEENT: Normocephalic;  conjunctivae and sclerae clear; moist mucous membranes;   NECK : supple  CHEST/LUNG: Clear to auscultation bilaterally with good air entry   HEART: S1 S2  regular; no murmurs, gallops or rubs  ABDOMEN: Soft, Nontender, Nondistended; Bowel sounds present  EXTREMITIES: No cyanosis; no edema; no calf tenderness  NERVOUS SYSTEM:  Awake and alert; Oriented  to place, person and time ; no new deficits, R facial droop, LUE weakness (can not lift against gravity)    _________________________________________________  LABS:                        10.0   4.6   )-----------( 237      ( 31 Mar 2018 07:40 )             32.1     03-31    141  |  108  |  17  ----------------------------<  149<H>  4.1   |  29  |  0.56    Ca    8.5      31 Mar 2018 07:40          CAPILLARY BLOOD GLUCOSE      POCT Blood Glucose.: 135 mg/dL (01 Apr 2018 06:05)  POCT Blood Glucose.: 96 mg/dL (01 Apr 2018 00:02)  POCT Blood Glucose.: 180 mg/dL (31 Mar 2018 18:51)  POCT Blood Glucose.: 193 mg/dL (31 Mar 2018 17:54)  POCT Blood Glucose.: 305 mg/dL (31 Mar 2018 11:49)  POCT Blood Glucose.: 159 mg/dL (31 Mar 2018 07:29)        RADIOLOGY & ADDITIONAL TESTS:    Imaging Personally Reviewed:  YES    Consultant(s) Notes Reviewed:   YES    Care Discussed with Consultants : YES    Plan of care was discussed with patient and /or primary care giver; all questions and concerns were addressed and care was aligned with patient's wishes.

## 2018-04-02 VITALS
DIASTOLIC BLOOD PRESSURE: 73 MMHG | HEART RATE: 68 BPM | RESPIRATION RATE: 18 BRPM | SYSTOLIC BLOOD PRESSURE: 177 MMHG | OXYGEN SATURATION: 96 % | TEMPERATURE: 98 F

## 2018-04-02 LAB
ANION GAP SERPL CALC-SCNC: 7 MMOL/L — SIGNIFICANT CHANGE UP (ref 5–17)
BASOPHILS # BLD AUTO: 0.1 K/UL — SIGNIFICANT CHANGE UP (ref 0–0.2)
BASOPHILS NFR BLD AUTO: 0.9 % — SIGNIFICANT CHANGE UP (ref 0–2)
BUN SERPL-MCNC: 29 MG/DL — HIGH (ref 7–18)
CALCIUM SERPL-MCNC: 8.3 MG/DL — LOW (ref 8.4–10.5)
CHLORIDE SERPL-SCNC: 107 MMOL/L — SIGNIFICANT CHANGE UP (ref 96–108)
CO2 SERPL-SCNC: 28 MMOL/L — SIGNIFICANT CHANGE UP (ref 22–31)
CREAT SERPL-MCNC: 0.69 MG/DL — SIGNIFICANT CHANGE UP (ref 0.5–1.3)
CULTURE RESULTS: SIGNIFICANT CHANGE UP
CULTURE RESULTS: SIGNIFICANT CHANGE UP
EOSINOPHIL # BLD AUTO: 0.1 K/UL — SIGNIFICANT CHANGE UP (ref 0–0.5)
EOSINOPHIL NFR BLD AUTO: 1.7 % — SIGNIFICANT CHANGE UP (ref 0–6)
GLUCOSE BLDC GLUCOMTR-MCNC: 171 MG/DL — HIGH (ref 70–99)
GLUCOSE BLDC GLUCOMTR-MCNC: 243 MG/DL — HIGH (ref 70–99)
GLUCOSE BLDC GLUCOMTR-MCNC: 326 MG/DL — HIGH (ref 70–99)
GLUCOSE SERPL-MCNC: 167 MG/DL — HIGH (ref 70–99)
HCT VFR BLD CALC: 30.7 % — LOW (ref 34.5–45)
HGB BLD-MCNC: 9.3 G/DL — LOW (ref 11.5–15.5)
LYMPHOCYTES # BLD AUTO: 1.5 K/UL — SIGNIFICANT CHANGE UP (ref 1–3.3)
LYMPHOCYTES # BLD AUTO: 21.4 % — SIGNIFICANT CHANGE UP (ref 13–44)
MCHC RBC-ENTMCNC: 29.4 PG — SIGNIFICANT CHANGE UP (ref 27–34)
MCHC RBC-ENTMCNC: 30.5 GM/DL — LOW (ref 32–36)
MCV RBC AUTO: 96.5 FL — SIGNIFICANT CHANGE UP (ref 80–100)
MONOCYTES # BLD AUTO: 0.6 K/UL — SIGNIFICANT CHANGE UP (ref 0–0.9)
MONOCYTES NFR BLD AUTO: 8.1 % — SIGNIFICANT CHANGE UP (ref 2–14)
NEUTROPHILS # BLD AUTO: 4.9 K/UL — SIGNIFICANT CHANGE UP (ref 1.8–7.4)
NEUTROPHILS NFR BLD AUTO: 67.9 % — SIGNIFICANT CHANGE UP (ref 43–77)
PLATELET # BLD AUTO: 225 K/UL — SIGNIFICANT CHANGE UP (ref 150–400)
POTASSIUM SERPL-MCNC: 4 MMOL/L — SIGNIFICANT CHANGE UP (ref 3.5–5.3)
POTASSIUM SERPL-SCNC: 4 MMOL/L — SIGNIFICANT CHANGE UP (ref 3.5–5.3)
RBC # BLD: 3.18 M/UL — LOW (ref 3.8–5.2)
RBC # FLD: 13.7 % — SIGNIFICANT CHANGE UP (ref 10.3–14.5)
SODIUM SERPL-SCNC: 142 MMOL/L — SIGNIFICANT CHANGE UP (ref 135–145)
SPECIMEN SOURCE: SIGNIFICANT CHANGE UP
SPECIMEN SOURCE: SIGNIFICANT CHANGE UP
WBC # BLD: 7.2 K/UL — SIGNIFICANT CHANGE UP (ref 3.8–10.5)
WBC # FLD AUTO: 7.2 K/UL — SIGNIFICANT CHANGE UP (ref 3.8–10.5)

## 2018-04-02 PROCEDURE — 82272 OCCULT BLD FECES 1-3 TESTS: CPT

## 2018-04-02 PROCEDURE — 70544 MR ANGIOGRAPHY HEAD W/O DYE: CPT

## 2018-04-02 PROCEDURE — 80048 BASIC METABOLIC PNL TOTAL CA: CPT

## 2018-04-02 PROCEDURE — 84145 PROCALCITONIN (PCT): CPT

## 2018-04-02 PROCEDURE — 70551 MRI BRAIN STEM W/O DYE: CPT

## 2018-04-02 PROCEDURE — 82306 VITAMIN D 25 HYDROXY: CPT

## 2018-04-02 PROCEDURE — 83880 ASSAY OF NATRIURETIC PEPTIDE: CPT

## 2018-04-02 PROCEDURE — 80053 COMPREHEN METABOLIC PANEL: CPT

## 2018-04-02 PROCEDURE — 81001 URINALYSIS AUTO W/SCOPE: CPT

## 2018-04-02 PROCEDURE — 84443 ASSAY THYROID STIM HORMONE: CPT

## 2018-04-02 PROCEDURE — 96374 THER/PROPH/DIAG INJ IV PUSH: CPT

## 2018-04-02 PROCEDURE — 82746 ASSAY OF FOLIC ACID SERUM: CPT

## 2018-04-02 PROCEDURE — 97116 GAIT TRAINING THERAPY: CPT

## 2018-04-02 PROCEDURE — 84484 ASSAY OF TROPONIN QUANT: CPT

## 2018-04-02 PROCEDURE — 85027 COMPLETE CBC AUTOMATED: CPT

## 2018-04-02 PROCEDURE — 97110 THERAPEUTIC EXERCISES: CPT

## 2018-04-02 PROCEDURE — 97530 THERAPEUTIC ACTIVITIES: CPT

## 2018-04-02 PROCEDURE — 71045 X-RAY EXAM CHEST 1 VIEW: CPT

## 2018-04-02 PROCEDURE — 72125 CT NECK SPINE W/O DYE: CPT

## 2018-04-02 PROCEDURE — 82009 KETONE BODYS QUAL: CPT

## 2018-04-02 PROCEDURE — 87040 BLOOD CULTURE FOR BACTERIA: CPT

## 2018-04-02 PROCEDURE — 85610 PROTHROMBIN TIME: CPT

## 2018-04-02 PROCEDURE — 95819 EEG AWAKE AND ASLEEP: CPT

## 2018-04-02 PROCEDURE — 70450 CT HEAD/BRAIN W/O DYE: CPT

## 2018-04-02 PROCEDURE — 83605 ASSAY OF LACTIC ACID: CPT

## 2018-04-02 PROCEDURE — 70548 MR ANGIOGRAPHY NECK W/DYE: CPT

## 2018-04-02 PROCEDURE — 97162 PT EVAL MOD COMPLEX 30 MIN: CPT

## 2018-04-02 PROCEDURE — 83550 IRON BINDING TEST: CPT

## 2018-04-02 PROCEDURE — 82962 GLUCOSE BLOOD TEST: CPT

## 2018-04-02 PROCEDURE — 93306 TTE W/DOPPLER COMPLETE: CPT

## 2018-04-02 PROCEDURE — 99285 EMERGENCY DEPT VISIT HI MDM: CPT | Mod: 25

## 2018-04-02 PROCEDURE — 82553 CREATINE MB FRACTION: CPT

## 2018-04-02 PROCEDURE — 82728 ASSAY OF FERRITIN: CPT

## 2018-04-02 PROCEDURE — 95957 EEG DIGITAL ANALYSIS: CPT

## 2018-04-02 PROCEDURE — 85730 THROMBOPLASTIN TIME PARTIAL: CPT

## 2018-04-02 PROCEDURE — 92610 EVALUATE SWALLOWING FUNCTION: CPT

## 2018-04-02 PROCEDURE — 82550 ASSAY OF CK (CPK): CPT

## 2018-04-02 PROCEDURE — 83735 ASSAY OF MAGNESIUM: CPT

## 2018-04-02 PROCEDURE — 84466 ASSAY OF TRANSFERRIN: CPT

## 2018-04-02 PROCEDURE — 84100 ASSAY OF PHOSPHORUS: CPT

## 2018-04-02 PROCEDURE — 96375 TX/PRO/DX INJ NEW DRUG ADDON: CPT

## 2018-04-02 PROCEDURE — 80061 LIPID PANEL: CPT

## 2018-04-02 PROCEDURE — 87086 URINE CULTURE/COLONY COUNT: CPT

## 2018-04-02 PROCEDURE — 99238 HOSP IP/OBS DSCHRG MGMT 30/<: CPT

## 2018-04-02 PROCEDURE — 83036 HEMOGLOBIN GLYCOSYLATED A1C: CPT

## 2018-04-02 PROCEDURE — 82607 VITAMIN B-12: CPT

## 2018-04-02 RX ORDER — METFORMIN HYDROCHLORIDE 850 MG/1
1 TABLET ORAL
Qty: 0 | Refills: 0 | COMMUNITY

## 2018-04-02 RX ORDER — LOSARTAN POTASSIUM 100 MG/1
50 TABLET, FILM COATED ORAL
Qty: 0 | Refills: 0 | COMMUNITY

## 2018-04-02 RX ORDER — LOSARTAN POTASSIUM 100 MG/1
100 TABLET, FILM COATED ORAL DAILY
Qty: 0 | Refills: 0 | Status: DISCONTINUED | OUTPATIENT
Start: 2018-04-02 | End: 2018-04-02

## 2018-04-02 RX ORDER — METFORMIN HYDROCHLORIDE 850 MG/1
0.5 TABLET ORAL
Qty: 0 | Refills: 0 | COMMUNITY

## 2018-04-02 RX ORDER — CARVEDILOL PHOSPHATE 80 MG/1
1 CAPSULE, EXTENDED RELEASE ORAL
Qty: 0 | Refills: 0 | COMMUNITY
Start: 2018-04-02

## 2018-04-02 RX ORDER — ASPIRIN/CALCIUM CARB/MAGNESIUM 324 MG
1 TABLET ORAL
Qty: 0 | Refills: 0 | COMMUNITY
Start: 2018-04-02

## 2018-04-02 RX ORDER — LOSARTAN POTASSIUM 100 MG/1
1 TABLET, FILM COATED ORAL
Qty: 0 | Refills: 0 | COMMUNITY
Start: 2018-04-02

## 2018-04-02 RX ORDER — CARVEDILOL PHOSPHATE 80 MG/1
1 CAPSULE, EXTENDED RELEASE ORAL
Qty: 0 | Refills: 0 | COMMUNITY

## 2018-04-02 RX ORDER — LOSARTAN POTASSIUM 100 MG/1
25 TABLET, FILM COATED ORAL ONCE
Qty: 0 | Refills: 0 | Status: COMPLETED | OUTPATIENT
Start: 2018-04-02 | End: 2018-04-02

## 2018-04-02 RX ORDER — ATORVASTATIN CALCIUM 80 MG/1
1 TABLET, FILM COATED ORAL
Qty: 0 | Refills: 0 | COMMUNITY
Start: 2018-04-02

## 2018-04-02 RX ADMIN — ENOXAPARIN SODIUM 40 MILLIGRAM(S): 100 INJECTION SUBCUTANEOUS at 11:37

## 2018-04-02 RX ADMIN — LOSARTAN POTASSIUM 75 MILLIGRAM(S): 100 TABLET, FILM COATED ORAL at 06:06

## 2018-04-02 RX ADMIN — Medication 2: at 00:17

## 2018-04-02 RX ADMIN — LOSARTAN POTASSIUM 25 MILLIGRAM(S): 100 TABLET, FILM COATED ORAL at 10:33

## 2018-04-02 RX ADMIN — CARVEDILOL PHOSPHATE 25 MILLIGRAM(S): 80 CAPSULE, EXTENDED RELEASE ORAL at 06:06

## 2018-04-02 RX ADMIN — Medication 4: at 12:25

## 2018-04-02 RX ADMIN — Medication 1: at 06:07

## 2018-04-02 RX ADMIN — Medication 81 MILLIGRAM(S): at 11:37

## 2018-04-02 NOTE — DIETITIAN INITIAL EVALUATION ADULT. - PROBLEM SELECTOR PLAN 3
Patient says she has h/o diabetes   Does not remember her meds   Primary team to obtain medications : pharmacy updated In chart   Will start on sliding scale for now  f/u a1c per MD

## 2018-04-02 NOTE — PROGRESS NOTE ADULT - PROBLEM SELECTOR PLAN 6
IMPROVE VTE Individual Risk Assessment    RISK                                                          Points  [] Previous VTE                                           3  [] Thrombophilia                                        2  [x] Lower limb paralysis                              2   [] Current Cancer                                       2   [x] Immobilization > 24 hrs                        1  [] ICU/CCU stay > 24 hours                       1  [x] Age > 60                                                   1    IMPROVE VTE Score: 4, DVT PPx w/ Lovenox
Patient with stroke and elderly, limited immobility at risk for DVT  Continue Lovenox

## 2018-04-02 NOTE — PROGRESS NOTE ADULT - ATTENDING COMMENTS
Patient was examined at the bedside and discussed with Dr. Sin.     She has been accepted to [facility] (formerly [facility]) and is eager to go.   left hemiplegia persists.

## 2018-04-02 NOTE — DIETITIAN INITIAL EVALUATION ADULT. - PT NOT SOURCE
pt alert, able to communicate well despite of slurred speech, however, pt declining RD interview at present

## 2018-04-02 NOTE — PROGRESS NOTE ADULT - ASSESSMENT
89 F, lives alone at home, AAOx3, ambulates w/ walker, PMH of DM, HTN, Hx of Bell's Palsy (R face), ?Dementia (2-3 months of forgetfulness) p/w unwitnessed fall - found to have a R pontine infarct - admitted for management of CVA
89 yo F , lives alone at home , AAO*3 , walks with help of a walker , PMH of DM , HTN , [place] palsy in past on right side of face,  was brought in by neighbour after an unwitnessed fall. Patient was last seen yesterday by neighbour , doing her daily chores. As per the patient , patient had a forward fall this morning and since then she could not get up until she was found on the floor by her Physical therapist and neighbour in afternoon , who decided to bring her here. As per neighbour , patient has had recent onset of forgetfulness ( last 2-3 months ).         patient wasadmitted  syncope work up , given the history of frequent falls. patient also found to have left sided arm and mild leg weakness on left concerning for acute stroke
89 F, lives alone at home, AAOx3, ambulates w/ walker, PMH of DM, HTN, Hx of Bell's Palsy (R face), ?Dementia (2-3 months of forgetfulness) p/w unwitnessed fall - found to have a R pontine infarct - admitted for management of CVA
89 F, lives alone at home, AAOx3, ambulates w/ walker, PMH of DM, HTN, Hx of Bell's Palsy (R face), ?Dementia (2-3 months of forgetfulness) p/w unwitnessed fall - found to have a R pontine infarct - admitted for management of CVA

## 2018-04-02 NOTE — DIETITIAN INITIAL EVALUATION ADULT. - NUTRITION INTERVENTION
Meals and Snack/Collaboration and Referral of Nutrition Care/Feeding Assistance/Discharge and Transfer of Nutrition Care to New Setting Feeding Assistance/Collaboration and Referral of Nutrition Care/Meals and Snack

## 2018-04-02 NOTE — DIETITIAN INITIAL EVALUATION ADULT. - PROBLEM SELECTOR PLAN 1
Patient comes in with frequent falls .  Will rule out cardiac and neurological causes of syncope  Monitor on Telemetry , Dr. Jean consulted  Cardiac enzymes , trop*1 : 0.007 , Monitor T2 , T3   Get echo   Neurological causes : Ct head was negative , Ct spine shows degenerative changes in spine at multiple levels   Will rule out CVA , start on asa, statin for now   Will get Vitamin B12 , D3 levels , r/o peripheral neuropathy   Could be diabetic neuropathy , will check a1c   Patient has hb of 10.8 , unknown baseline ( please obtain baseline from PMD )  Will order anemia panel , check folate , tsh   will check Orthostatic to rule out vasovagal causes   Neuro evaluation , Dr. David , no h/o seizures  Will get PT consult per MD

## 2018-04-02 NOTE — DIETITIAN INITIAL EVALUATION ADULT. - SIGNS/SYMPTOMS
Swallow evaluation reported, on dysphagia diet Rx, slurred speech/left side weakness Finger stick range=96 to 335   EotS9Z=2.8    Cjs=071

## 2018-04-02 NOTE — PROGRESS NOTE ADULT - PROBLEM SELECTOR PLAN 1
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 David, NSG Dr. Burciaga recommends outpatient f/u with endovascular for CTA  - C/w ASA, Statin, and Lovenox; telemetry for r/o Afib  - S/p Permissive HTN x 48 total hours;   - Increasing BP meds for elevated BP 170s; C/w Coreg 25 BID and increased Losartan 100 daily  - PT recommend JB; patient agrees  - Swallow evaluation noted; Pureed + Thin  - TTE normal EF, G1DD

## 2018-04-02 NOTE — DIETITIAN INITIAL EVALUATION ADULT. - PROBLEM SELECTOR PLAN 2
Patient comes in with no cough , no fever   Noted to have a white count of 10.8 with left shift   CXR shows a questionable Right hilum infiltrate ?  s/p one dose of Rocephin , Zithromax in Ed  f/u blood cx   f/u procalcitonin  Restart antibiotics if needed per MD

## 2018-04-02 NOTE — PROGRESS NOTE ADULT - PROBLEM SELECTOR PROBLEM 1
Acute CVA (cerebrovascular accident)

## 2018-04-02 NOTE — PROGRESS NOTE ADULT - PROVIDER SPECIALTY LIST ADULT
Hospitalist
Internal Medicine

## 2018-04-02 NOTE — PROGRESS NOTE ADULT - SUBJECTIVE AND OBJECTIVE BOX
PGY 1 Note discussed with supervising resident and primary attending    Patient is a 89y old  Female who presents with a chief complaint of Fall (29 Mar 2018 18:28)      INTERVAL HPI/OVERNIGHT EVENTS: Patient seen and examined at bedside with no new complaints    MEDICATIONS  (STANDING):  aspirin  chewable 81 milliGRAM(s) Oral daily  atorvastatin 80 milliGRAM(s) Oral at bedtime  carvedilol 25 milliGRAM(s) Oral every 12 hours  enoxaparin Injectable 40 milliGRAM(s) SubCutaneous daily  insulin lispro (HumaLOG) corrective regimen sliding scale   SubCutaneous every 6 hours  losartan 75 milliGRAM(s) Oral daily  sodium chloride 0.9% with potassium chloride 20 mEq/L 1000 milliLiter(s) (55 mL/Hr) IV Continuous <Continuous>    MEDICATIONS  (PRN):  metoprolol tartrate Injectable 2.5 milliGRAM(s) IV Push every 6 hours PRN sbp>180, hold for HR<65  polyethylene glycol 3350 17 Gram(s) Oral daily PRN Constipation      __________________________________________________  REVIEW OF SYSTEMS:  RESPIRATORY: No cough; No shortness of breath  CARDIOVASCULAR: No chest pain, no palpitations  GASTROINTESTINAL: No pain. No nausea or vomiting; No diarrhea   NEUROLOGICAL: No headache or numbness, no tremors       Vital Signs Last 24 Hrs  T(C): 36.9 (02 Apr 2018 06:00), Max: 36.9 (02 Apr 2018 06:00)  T(F): 98.5 (02 Apr 2018 06:00), Max: 98.5 (02 Apr 2018 06:00)  HR: 65 (02 Apr 2018 06:00) (65 - 84)  BP: 172/57 (02 Apr 2018 06:00) (144/72 - 172/57)  BP(mean): --  RR: 16 (02 Apr 2018 06:00) (16 - 17)  SpO2: 100% (02 Apr 2018 06:00) (99% - 100%)    ________________________________________________  PHYSICAL EXAM:  GENERAL: NAD  HEENT: Normocephalic;  conjunctivae and sclerae clear; moist mucous membranes;   NECK : supple  CHEST/LUNG: Clear to auscultation bilaterally with good air entry   HEART: S1 S2  regular; no murmurs, gallops or rubs  ABDOMEN: Soft, Nontender, Nondistended; Bowel sounds present  EXTREMITIES: No cyanosis; no edema; no calf tenderness  NERVOUS SYSTEM:  Awake and alert; Oriented  to place, person and time ; no new deficits, LUE weakness and L facial droop    _________________________________________________  LABS:                        9.9    5.3   )-----------( 222      ( 01 Apr 2018 08:48 )             31.8     04-02    142  |  107  |  29<H>  ----------------------------<  167<H>  4.0   |  28  |  0.69    Ca    8.3<L>      02 Apr 2018 06:17          CAPILLARY BLOOD GLUCOSE      POCT Blood Glucose.: 171 mg/dL (02 Apr 2018 05:47)  POCT Blood Glucose.: 243 mg/dL (02 Apr 2018 00:07)  POCT Blood Glucose.: 297 mg/dL (01 Apr 2018 16:45)  POCT Blood Glucose.: 335 mg/dL (01 Apr 2018 11:30)        RADIOLOGY & ADDITIONAL TESTS:    Imaging Personally Reviewed:  YES    Consultant(s) Notes Reviewed:   YES    Care Discussed with Consultants : YES    Plan of care was discussed with patient and /or primary care giver; all questions and concerns were addressed and care was aligned with patient's wishes.

## 2018-04-02 NOTE — DIETITIAN INITIAL EVALUATION ADULT. - OTHER INFO
nutrition assessment for length of stay; lives alone at home; skin intact; s/p swallow evaluation with puree diet, thin liquid recommended 3/29/18; observed breakfast 100% intake; no GI distress reported at present per RN; possible for skilled nursing facility placement

## 2018-04-02 NOTE — PROGRESS NOTE ADULT - PROBLEM SELECTOR PROBLEM 7
Goals of care, counseling/discussion

## 2018-06-05 ENCOUNTER — APPOINTMENT (OUTPATIENT)
Dept: OPHTHALMOLOGY | Facility: CLINIC | Age: 83
End: 2018-06-05

## 2018-08-22 NOTE — PATIENT PROFILE ADULT. - FUNCTIONAL SCREEN CURRENT LEVEL: DRESSING, MLM
[address]                                SLEEP STUDY REPORT    PATIENT NAME: Augusta Juarez                 :        1983  MED REC NO:   [id number]                            ROOM:  ACCOUNT NO:   [de-identified]                           ADMIT DATE: 2018  PROVIDER:     Nancy Burdick MD    DATE OF STUDY:  2018    The patient had diagnostic home sleep test and this is an over-read report  of Dr. Julienne Sweet. I agree with the interpretation and recommendations. Appropriate follow up  is recommended.         Reji Herrera MD    D: 2018 12:34:48       T: 2018 3:38:37     [email or web address]  Job#: [id number]     Doc#: [id number]    CC: (4) completely dependent

## 2019-03-25 NOTE — PROGRESS NOTE ADULT - PROBLEM/PLAN-4
DISPLAY PLAN FREE TEXT
No difficulties

## 2020-07-29 NOTE — PROGRESS NOTE ADULT - PROBLEM/PLAN-7
PreAdmit Appointment: Patient given Preparing for your procedure handout. Patient instructed to continue regularly prescribed medications through day before surgery. Instructed to take the following medications the day of surgery with a sip of water per Anesthesia protocol: Patient denies issues with anesthesia. Covid pending. Patient instructed to take Tylenol, Zofran and Oxycodone if needed for pain. Education provided.  
DISPLAY PLAN FREE TEXT

## 2021-01-01 NOTE — ED ADULT NURSE NOTE - CAS EDN INTEG ASSESS
[FreeTextEntry1] : 3 day old female ex 38.2 weeker, presenting for weight check. Patient is feeding mainly expressed breast milk about 2-3 oz every 2-3 hrs. Also giving Enfamil 2-3oz every 3 hours if breast milk not available. patient having adequate voids and stools. Has now surpassed birthweight, with an estimated gain of 26 grams a day. Mother expresses trying to breastfeed but having pain while latching. No other complaints.  Lactation consulted while in office and provided recommendations. \par Patient to RTC in 3 weeks for 1 mo WCC. WDL

## 2021-08-27 NOTE — DISCHARGE NOTE ADULT - SECONDARY DIAGNOSIS.
History  Chief Complaint   Patient presents with    Abnormal Lab     pt states his triglycerides were over 3000 in recent lab work and doctor wanted him to be evaluated since he has a hx of pancreatitis     51 yo male with h/o severe hypertriglyceridemia and secondary pancreatitis who was instructed to come to ED for evaluation of outpatient labs which revealed elevated triglycerides  He is asymptomatic  Denies abdominal pain  Follows with GI and endocrinology for hypertriglyceridemia  Takes tresiba  Usually TG is around 300-500 but states today he was advised that it was above 3,000  Prior to Admission Medications   Prescriptions Last Dose Informant Patient Reported? Taking?    Choline Fenofibrate (Fenofibric Acid) 135 MG CPDR  Self No No   Sig: Take 1 capsule (135 mg total) by mouth daily   EPIPEN 2-ARIEL 0 3 MG/0 3ML SOAJ  Self No No   Sig: Inject one syringe (0 3mg) intramuscularly once as directed   Lidocaine-Menthol 4-1 % GEL  Self Yes No   Sig: NuLido 4 %-1 % topical gel   Apply to affected area 2-4 times a day as needed for pain   Patient not taking: Reported on 8/5/2021   Pancrelipase, Lip-Prot-Amyl, (Creon) 09070 units CPEP  Self No No   Sig: Take 1 capsule (36,000 Units total) by mouth 3 (three) times a day before meals   albuterol (PROVENTIL HFA,VENTOLIN HFA) 90 mcg/act inhaler  Self No No   Sig: INHALE TWO PUFFS BY MOUTH EVERY 6 HOURS AS NEEDED FOR WHEEZING   amLODIPine (NORVASC) 5 mg tablet  Self No No   Sig: TAKE ONE TABLET BY MOUTH EVERY DAY    diclofenac sodium (VOLTAREN) 1 %  Self Yes No   Sig: diclofenac 1 % topical gel   APPLY 2 GRAMS TO THE AFFECTED AREA(S) BY TOPICAL ROUTE 4 TIMES PER DAY   Patient not taking: Reported on 8/5/2021   dicyclomine (BENTYL) 20 mg tablet  Self No No   Sig: Take 1 tablet (20 mg total) by mouth 2 (two) times a day   Patient not taking: Reported on 8/5/2021   esomeprazole (NexIUM) 40 MG capsule   No No   Sig: TAKE ONE CAPSULE BY MOUTH TWICE DAILY BEFORE MEALS famotidine (PEPCID) 40 MG tablet  Self Yes No   Sig: Take 40 mg by mouth 2 (two) times a day   insulin degludec Lacretia Bent FlexTouch) 100 units/mL injection pen  Self Yes No   Si units subcut in hs   lisinopril (ZESTRIL) 20 mg tablet  Self No No   Sig: TAKE ONE TABLET BY MOUTH EVERY DAY    methylPREDNISolone 4 MG tablet therapy pack   No No   Sig: Use as directed on package   omega-3-acid ethyl esters (LOVAZA) 1 g capsule  Self No No   Sig: Take 2 capsules (2 g total) by mouth 2 (two) times a day   omeprazole (PriLOSEC) 20 mg delayed release capsule  Self Yes No   Sig: Take 20 mg by mouth daily   ondansetron (ZOFRAN-ODT) 4 mg disintegrating tablet   No No   Sig: DISSOLVE ONE TABLET IN MOUTH EVERY EIGHT HOURS AS NEEDED NAUSEA OR VOMITING    oxyCODONE-acetaminophen (PERCOCET) 5-325 mg per tablet  Self No No   Sig: Take 1 tablet by mouth every 6 (six) hours as needed for moderate painMax Daily Amount: 4 tablets   Patient not taking: Reported on 2021   pancrelipase, Lip-Prot-Amyl, (Creon) 12,000 units capsule  Self No No   Sig: Take 12,000 units of lipase by mouth as needed for snacks   pregabalin (LYRICA) 100 mg capsule  Self No No   Sig: TAKE ONE CAPSULE BY MOUTH TWICE DAILY    rosuvastatin (CRESTOR) 40 MG tablet  Self No No   Sig: TAKE ONE TABLET BY MOUTH ONCE A DAY    traZODone (DESYREL) 50 mg tablet   No No   Sig: TAKE ONE TABLET BY MOUTH NIGHTLY AT BEDTIME       Facility-Administered Medications: None       Past Medical History:   Diagnosis Date    Asthma     Chronic pain disorder     Diabetes mellitus (HCC)     GERD (gastroesophageal reflux disease)     Hyperlipidemia     Liver abscess     Meniscus tear     left knee work injury last assessed 2016    Pancreatitis     Pneumonia        Past Surgical History:   Procedure Laterality Date    CELIAC PLEXUS BLOCK Bilateral 10/29/2018    Procedure: SPLANCHNIC NERVE BLOCK;  Surgeon: Shanta Lopez MD;  [facility];  Service: Pain Management     CELIAC PLEXUS BLOCK Bilateral 1/10/2019    Procedure: SPLANCHNIC NERVE BLOCK;  Surgeon: Lj Blanton MD;  [facility];  Service: Pain Management     CHOLECYSTECTOMY      ESOPHAGOGASTRODUODENOSCOPY N/A 11/16/2017    Procedure: ESOPHAGOGASTRODUODENOSCOPY (EGD); Surgeon: Penelope Nolen MD;  [facility]; Service: Gastroenterology    ESOPHAGOGASTRODUODENOSCOPY N/A 4/19/2018    Procedure: ESOPHAGOGASTRODUODENOSCOPY (EGD); Surgeon: Fiorella Martinez MD;  [facility]; Service: Gastroenterology    ESOPHAGOGASTRODUODENOSCOPY N/A 5/10/2018    Procedure: ESOPHAGOGASTRODUODENOSCOPY (EGD); Surgeon: Kaylin John MD;  [facility];   Service: Gastroenterology    IR TEMPORARY DIALYSIS CATHETER PLACEMENT  2/28/2019    KNEE ARTHROSCOPY Bilateral     KNEE ARTHROSCOPY Right 2009    cibishino last assessed 08/24/2016    KNEE ARTHROSCOPY Right 07/01/2019    LIVER SURGERY      NERVE BLOCK Bilateral 5/29/2018    Procedure: SPLANCHNIC NERVE BLOCK;  Surgeon: Lj Blanton MD;  [facility];  Service: Pain Management     PANCREAS SURGERY      stents    PANCREATIC CYST EXCISION      MT INJECT NERV BLCK,CELIAC PLEXUS Bilateral 5/9/2017    Procedure: CELIAC PLEXUS BLOCK ;  Surgeon: Lj Blanton MD;  [facility];  Service: Pain Management     MT INJECT NERV BLCK,CELIAC PLEXUS Bilateral 6/1/2017    Procedure: SPLANCHNIC NERVE BLOCK at T12;  Surgeon: Lj Blanton MD;  [facility];  Service: Pain Management     MT INJECT NERV BLCK,CELIAC PLEXUS Bilateral 8/8/2017    Procedure: BILATERAL SPLANCHNIC NERVE BLOCK T12;  Surgeon: Lj Blanton MD;  [facility];  Service: Pain Management     MT INJECT NERV BLCK,CELIAC PLEXUS Bilateral 4/18/2019    Procedure: BLOCK / INJECTION CELIAC PLEXUS;  Surgeon: Lj Blanton MD;  [facility];  Service: Pain Management     MT INJECT NERV BLCK,CELIAC PLEXUS Bilateral 8/20/2019    Procedure: SPLANCHNIC NERVE BLOCK;  Surgeon: Deirdre Tong MD;  [facility];  Service: Pain Management     VA INJECT NERV BLCK,CELIAC PLEXUS Bilateral 10/17/2019    Procedure: SPLANCHNIC NERVE BLOCK;  Surgeon: Deirdre Tong MD;  [facility];  Service: Pain Management     VA INJECT NERV Archana Billing Bilateral 2017    Procedure: SPLANCHNIC NERVE BLOCK;  Surgeon: Deirdre Tong MD;  [facility];  Service: Pain Management     VA LAP,CHOLECYSTECTOMY N/A 2017    Procedure: LAPAROSCOPIC CHOLECYSTECTOMY, IOC, POSSIBLE OPEN ;  Surgeon: Dorina Dickerson MD;  [facility];  Service: General    ROTATOR CUFF REPAIR Right     SHOULDER ARTHROSCOPY      SHOULDER ARTHROSCOPY Right        Family History   Problem Relation Age of Onset    Cirrhosis Mother     Heart disease Other         cardiac disorder    Cancer Other      I have reviewed and agree with the history as documented  E-Cigarette/Vaping    E-Cigarette Use Never User      E-Cigarette/Vaping Substances    Nicotine No     THC No     CBD No     Flavoring No     Other No     Unknown No      Social History     Tobacco Use    Smoking status: Former Smoker     Packs/day: 1 00     Years: 20 00     Pack years: 20 00     Quit date: 3/15/2021     Years since quittin 4    Smokeless tobacco: Never Used   Vaping Use    Vaping Use: Never used   Substance Use Topics    Alcohol use: Not Currently     Alcohol/week: 10 0 standard drinks     Types: 10 Cans of beer per week     Comment: quit    Drug use: No       Review of Systems   Constitutional: Negative for chills and fever  Gastrointestinal: Negative for abdominal pain, nausea and vomiting  All other systems reviewed and are negative  Physical Exam  Physical Exam  Vitals and nursing note reviewed  Constitutional:       General: He is not in acute distress  Appearance: Normal appearance  He is well-developed   He is not ill-appearing, toxic-appearing or diaphoretic  HENT:      Head: Normocephalic and atraumatic  Eyes:      Conjunctiva/sclera: Conjunctivae normal       Pupils: Pupils are equal, round, and reactive to light  Neck:      Vascular: No JVD  Cardiovascular:      Rate and Rhythm: Normal rate and regular rhythm  Heart sounds: Normal heart sounds  No murmur heard  No friction rub  No gallop  Pulmonary:      Effort: Pulmonary effort is normal  No respiratory distress  Breath sounds: Normal breath sounds  No stridor  No wheezing or rales  Abdominal:      General: There is no distension  Palpations: Abdomen is soft  Tenderness: There is no abdominal tenderness  Musculoskeletal:         General: No tenderness or deformity  Normal range of motion  Cervical back: Normal range of motion and neck supple  Skin:     General: Skin is warm and dry  Capillary Refill: Capillary refill takes less than 2 seconds  Neurological:      Mental Status: He is alert and oriented to person, place, and time  Cranial Nerves: No cranial nerve deficit  Sensory: No sensory deficit  Motor: No abnormal muscle tone        Coordination: Coordination normal          Vital Signs  ED Triage Vitals [08/27/21 1031]   Temperature Pulse Respirations Blood Pressure SpO2   98 °F (36 7 °C) 94 16 133/79 95 %      Temp Source Heart Rate Source Patient Position - Orthostatic VS BP Location FiO2 (%)   Temporal Monitor Sitting Left arm --      Pain Score       --           Vitals:    08/27/21 1031 08/27/21 1634   BP: 133/79 125/76   Pulse: 94 94   Patient Position - Orthostatic VS: Sitting Lying         Visual Acuity      ED Medications  Medications - No data to display    Diagnostic Studies  Results Reviewed     Procedure Component Value Units Date/Time    Triglycerides [[id number]]  (Abnormal) Collected: 08/27/21 1149    Lab Status: Final result Specimen: Blood from Hand, Right Updated: 08/27/21 1628     Triglycerides 1,365 mg/dL Triglycerides [[id number]]     Lab Status: No result Specimen: Blood     Triglycerides [[id number]]     Lab Status: No result Specimen: Blood     CBC and differential [[id number]]  (Abnormal) Collected: 08/27/21 1149    Lab Status: Final result Specimen: Blood from Hand, Right Updated: 08/27/21 1227     WBC 5 36 Thousand/uL      RBC 4 35 Million/uL      Hemoglobin 14 2 g/dL      Hematocrit 40 7 %      MCV 94 fL      MCH 32 6 pg      MCHC 34 9 g/dL      RDW 12 6 %      MPV 10 4 fL      Platelets 608 Thousands/uL      nRBC 0 /100 WBCs      Neutrophils Relative 62 %      Immat GRANS % 0 %      Lymphocytes Relative 28 %      Monocytes Relative 8 %      Eosinophils Relative 1 %      Basophils Relative 1 %      Neutrophils Absolute 3 32 Thousands/µL      Immature Grans Absolute 0 02 Thousand/uL      Lymphocytes Absolute 1 49 Thousands/µL      Monocytes Absolute 0 44 Thousand/µL      Eosinophils Absolute 0 06 Thousand/µL      Basophils Absolute 0 03 Thousands/µL     Comprehensive metabolic panel [[id number]]  (Abnormal) Collected: 08/27/21 1149    Lab Status: Final result Specimen: Blood from Hand, Right Updated: 08/27/21 1225     Sodium 137 mmol/L      Potassium 5 1 mmol/L      Chloride 100 mmol/L      CO2 25 mmol/L      ANION GAP 12 mmol/L      BUN 16 mg/dL      Creatinine 0 80 mg/dL      Glucose 151 mg/dL      Calcium 9 2 mg/dL       U/L       U/L      Alkaline Phosphatase 166 U/L      Total Protein 7 5 g/dL      Albumin 4 0 g/dL      Total Bilirubin 0 72 mg/dL      eGFR 106 ml/min/1 73sq m     Narrative:      [place] guidelines for Chronic Kidney Disease (CKD):     Stage 1 with normal or high GFR (GFR > 90 mL/min/1 73 square meters)    Stage 2 Mild CKD (GFR = 60-89 mL/min/1 73 square meters)    Stage 3A Moderate CKD (GFR = 45-59 mL/min/1 73 square meters)    Stage 3B Moderate CKD (GFR = 30-44 mL/min/1 73 square meters)    Stage 4 Severe CKD (GFR = 15-29 mL/min/1 73 square meters)    Stage 5 End Stage CKD (GFR <15 mL/min/1 73 square meters)  Note: GFR calculation is accurate only with a steady state creatinine    Lipase [[id number]]  (Normal) Collected: 08/27/21 1149    Lab Status: Final result Specimen: Blood from Hand, Right Updated: 08/27/21 1225     Lipase 93 u/L                  No orders to display              Procedures  Procedures         ED Course                                           MDM    Disposition  Final diagnoses:   Hypertriglyceridemia     Time reflects when diagnosis was documented in both MDM as applicable and the Disposition within this note     Time User Action Codes Description Comment    8/27/2021  5:01 PM Marcos Billingsley Add [E78 1] Hypertriglyceridemia       ED Disposition     ED Disposition Condition Date/Time Comment    Discharge Stable Fri Aug 27, 2021  5:01 PM Perlie Pack discharge to home/self care              Follow-up Information     Follow up With Specialties Details Why Contact Info Additional Information    [id number] [facility] Emergency Medicine  if you have abdominal pain or any other concerns [address] Emergency Department, [address] [place] [address], [phone]          Discharge Medication List as of 8/27/2021  5:01 PM      CONTINUE these medications which have NOT CHANGED    Details   albuterol (PROVENTIL HFA,VENTOLIN HFA) 90 mcg/act inhaler INHALE TWO PUFFS BY MOUTH EVERY 6 HOURS AS NEEDED FOR WHEEZING, Normal      amLODIPine (NORVASC) 5 mg tablet TAKE ONE TABLET BY MOUTH EVERY DAY , Normal      Choline Fenofibrate (Fenofibric Acid) 135 MG CPDR Take 1 capsule (135 mg total) by mouth daily, Starting Mon 2/15/2021, Normal      diclofenac sodium (VOLTAREN) 1 % diclofenac 1 % topical gel   APPLY 2 GRAMS TO THE AFFECTED AREA(S) BY TOPICAL ROUTE 4 TIMES PER DAY, Historical Med      dicyclomine (BENTYL) 20 mg tablet Take 1 tablet (20 mg total) by mouth 2 (two) times a day, Starting Sat 3/13/2021, Print      EPIPEN 2-ARIEL 0 3 MG/0 3ML SOAJ Inject one syringe (0 3mg) intramuscularly once as directed, Normal      esomeprazole (NexIUM) 40 MG capsule TAKE ONE CAPSULE BY MOUTH TWICE DAILY BEFORE MEALS , Normal      famotidine (PEPCID) 40 MG tablet Take 40 mg by mouth 2 (two) times a day, Historical Med      insulin degludec Ramesh Arpit FlexTouch) 100 units/mL injection pen 20 units subcut in hs, Historical Med      Lidocaine-Menthol 4-1 % GEL NuLido 4 %-1 % topical gel   Apply to affected area 2-4 times a day as needed for pain, Historical Med      lisinopril (ZESTRIL) 20 mg tablet TAKE ONE TABLET BY MOUTH EVERY DAY , Normal      methylPREDNISolone 4 MG tablet therapy pack Use as directed on package, Normal      omega-3-acid ethyl esters (LOVAZA) 1 g capsule Take 2 capsules (2 g total) by mouth 2 (two) times a day, Starting Thu 5/13/2021, Normal      omeprazole (PriLOSEC) 20 mg delayed release capsule Take 20 mg by mouth daily, Historical Med      ondansetron (ZOFRAN-ODT) 4 mg disintegrating tablet DISSOLVE ONE TABLET IN MOUTH EVERY EIGHT HOURS AS NEEDED NAUSEA OR VOMITING , Normal      oxyCODONE-acetaminophen (PERCOCET) 5-325 mg per tablet Take 1 tablet by mouth every 6 (six) hours as needed for moderate painMax Daily Amount: 4 tablets, Starting Wed 1/20/2021, Normal      pancrelipase, Lip-Prot-Amyl, (Creon) 12,000 units capsule Take 12,000 units of lipase by mouth as needed for snacks, Starting Thu 11/12/2020, Normal      Pancrelipase, Lip-Prot-Amyl, (Creon) 54235 units CPEP Take 1 capsule (36,000 Units total) by mouth 3 (three) times a day before meals, Starting Thu 11/12/2020, Normal      pregabalin (LYRICA) 100 mg capsule TAKE ONE CAPSULE BY MOUTH TWICE DAILY , Normal      rosuvastatin (CRESTOR) 40 MG tablet TAKE ONE TABLET BY MOUTH ONCE A DAY , Normal      traZODone (DESYREL) 50 mg tablet TAKE ONE TABLET BY MOUTH NIGHTLY AT BEDTIME , Normal           No discharge procedures on file      PDMP Review       Value Time User    PDMP Reviewed  Yes 8/5/2021  8:33 Cinthia Lobato MD          ED Provider  Electronically Signed by           Rc Candelaria MD  08/27/21 6785 Diabetes HTN (hypertension) Goals of care, counseling/discussion

## 2024-09-23 NOTE — SWALLOW BEDSIDE ASSESSMENT ADULT - SWALLOW EVAL: SECRETION MANAGEMENT
PLAN:  Medications:   Taking Wellbutrin 300 mg daily  Taking Sertraline 150 mg daily  Taking Topiramate 100 mg nightly   Taking Lyrica 75 mg nightly  Taking Tizanidine 2 mg 1-2 tabs every 8 hours as needed  Diagnostics:   None indicated at this time.  Interventions:   None indicated at this time.  In the future, consider L4-L5 lumbar epidural injection with local anaesthetic only (no steroid).    Advanced/Devices:   None indicated at this time.  Consults: None indicated at this time.  Physical Therapy: Recommend ongoing activity as tolerated.   Importance of exercising 150 minutes a week, stretching, mindfulness, healthy diet choices, goals for healthy weight  Return if symptoms worsen or fail to improve.   
left corner drooling

## 2025-01-23 NOTE — DIETITIAN INITIAL EVALUATION ADULT. - PROBLEM SELECTOR PLAN 4
Claims to have h/o HTN   Takes coreg 12.5 bid at home and something else  not sure of meds   Will start with coreg for now   Dash diet   Obtain medication list  f/u echo no per MD